# Patient Record
Sex: FEMALE | Race: OTHER | HISPANIC OR LATINO
[De-identification: names, ages, dates, MRNs, and addresses within clinical notes are randomized per-mention and may not be internally consistent; named-entity substitution may affect disease eponyms.]

---

## 2018-01-26 ENCOUNTER — RESULT REVIEW (OUTPATIENT)
Age: 48
End: 2018-01-26

## 2018-03-08 VITALS — WEIGHT: 258.2 LBS | BODY MASS INDEX: 48.75 KG/M2 | HEIGHT: 61 IN

## 2018-05-16 ENCOUNTER — RECORD ABSTRACTING (OUTPATIENT)
Age: 48
End: 2018-05-16

## 2018-05-16 DIAGNOSIS — Z83.49 FAMILY HISTORY OF OTHER ENDOCRINE, NUTRITIONAL AND METABOLIC DISEASES: ICD-10-CM

## 2018-05-16 DIAGNOSIS — Z87.59 PERSONAL HISTORY OF OTHER COMPLICATIONS OF PREGNANCY, CHILDBIRTH AND THE PUERPERIUM: ICD-10-CM

## 2018-05-16 DIAGNOSIS — Z92.89 PERSONAL HISTORY OF OTHER MEDICAL TREATMENT: ICD-10-CM

## 2018-05-16 DIAGNOSIS — Z82.49 FAMILY HISTORY OF ISCHEMIC HEART DISEASE AND OTHER DISEASES OF THE CIRCULATORY SYSTEM: ICD-10-CM

## 2018-05-16 DIAGNOSIS — Z78.9 OTHER SPECIFIED HEALTH STATUS: ICD-10-CM

## 2018-05-16 DIAGNOSIS — Z83.3 FAMILY HISTORY OF DIABETES MELLITUS: ICD-10-CM

## 2018-05-25 ENCOUNTER — APPOINTMENT (OUTPATIENT)
Dept: BARIATRICS | Facility: CLINIC | Age: 48
End: 2018-05-25
Payer: COMMERCIAL

## 2018-05-25 VITALS
BODY MASS INDEX: 49.65 KG/M2 | SYSTOLIC BLOOD PRESSURE: 162 MMHG | HEART RATE: 123 BPM | DIASTOLIC BLOOD PRESSURE: 98 MMHG | HEIGHT: 61 IN | WEIGHT: 263 LBS

## 2018-05-25 PROCEDURE — 99213 OFFICE O/P EST LOW 20 MIN: CPT

## 2018-06-15 ENCOUNTER — APPOINTMENT (OUTPATIENT)
Dept: BARIATRICS | Facility: CLINIC | Age: 48
End: 2018-06-15
Payer: COMMERCIAL

## 2018-06-15 VITALS
HEIGHT: 61 IN | WEIGHT: 239.6 LBS | SYSTOLIC BLOOD PRESSURE: 129 MMHG | BODY MASS INDEX: 45.24 KG/M2 | DIASTOLIC BLOOD PRESSURE: 83 MMHG

## 2018-06-15 PROCEDURE — 99024 POSTOP FOLLOW-UP VISIT: CPT

## 2018-07-05 ENCOUNTER — APPOINTMENT (OUTPATIENT)
Dept: BARIATRICS | Facility: CLINIC | Age: 48
End: 2018-07-05
Payer: COMMERCIAL

## 2018-07-05 VITALS
HEART RATE: 96 BPM | WEIGHT: 230 LBS | HEIGHT: 61 IN | BODY MASS INDEX: 43.43 KG/M2 | SYSTOLIC BLOOD PRESSURE: 135 MMHG | DIASTOLIC BLOOD PRESSURE: 84 MMHG

## 2018-07-05 PROCEDURE — 99024 POSTOP FOLLOW-UP VISIT: CPT

## 2018-08-02 ENCOUNTER — APPOINTMENT (OUTPATIENT)
Dept: BARIATRICS | Facility: CLINIC | Age: 48
End: 2018-08-02
Payer: COMMERCIAL

## 2018-08-02 VITALS
DIASTOLIC BLOOD PRESSURE: 89 MMHG | HEIGHT: 61 IN | SYSTOLIC BLOOD PRESSURE: 145 MMHG | HEART RATE: 73 BPM | WEIGHT: 222 LBS | BODY MASS INDEX: 41.91 KG/M2

## 2018-08-02 PROCEDURE — 99024 POSTOP FOLLOW-UP VISIT: CPT

## 2018-12-06 ENCOUNTER — APPOINTMENT (OUTPATIENT)
Dept: BARIATRICS | Facility: CLINIC | Age: 48
End: 2018-12-06
Payer: COMMERCIAL

## 2018-12-06 VITALS
HEIGHT: 65 IN | HEART RATE: 64 BPM | DIASTOLIC BLOOD PRESSURE: 82 MMHG | WEIGHT: 194.4 LBS | BODY MASS INDEX: 32.39 KG/M2 | SYSTOLIC BLOOD PRESSURE: 135 MMHG

## 2018-12-06 PROCEDURE — 99213 OFFICE O/P EST LOW 20 MIN: CPT

## 2018-12-10 ENCOUNTER — CLINICAL ADVICE (OUTPATIENT)
Age: 48
End: 2018-12-10

## 2018-12-22 ENCOUNTER — TRANSCRIPTION ENCOUNTER (OUTPATIENT)
Age: 48
End: 2018-12-22

## 2019-01-24 ENCOUNTER — RECORD ABSTRACTING (OUTPATIENT)
Age: 49
End: 2019-01-24

## 2019-01-24 DIAGNOSIS — G47.19 OTHER HYPERSOMNIA: ICD-10-CM

## 2019-01-24 DIAGNOSIS — D21.9 BENIGN NEOPLASM OF CONNECTIVE AND OTHER SOFT TISSUE, UNSPECIFIED: ICD-10-CM

## 2019-01-24 DIAGNOSIS — J30.1 ALLERGIC RHINITIS DUE TO POLLEN: ICD-10-CM

## 2019-01-24 LAB — CYTOLOGY CVX/VAG DOC THIN PREP: NORMAL

## 2019-01-29 ENCOUNTER — APPOINTMENT (OUTPATIENT)
Dept: OBGYN | Facility: CLINIC | Age: 49
End: 2019-01-29
Payer: COMMERCIAL

## 2019-01-29 VITALS
DIASTOLIC BLOOD PRESSURE: 80 MMHG | SYSTOLIC BLOOD PRESSURE: 128 MMHG | WEIGHT: 184 LBS | HEIGHT: 61 IN | BODY MASS INDEX: 34.74 KG/M2

## 2019-01-29 PROCEDURE — 99396 PREV VISIT EST AGE 40-64: CPT

## 2019-01-29 RX ORDER — TIOTROPIUM BROMIDE INHALATION SPRAY 1.56 UG/1
1.25 SPRAY, METERED RESPIRATORY (INHALATION)
Refills: 0 | Status: DISCONTINUED | COMMUNITY
End: 2019-01-29

## 2019-01-29 RX ORDER — LOSARTAN POTASSIUM AND HYDROCHLOROTHIAZIDE 100; 25 MG/1; MG/1
100-25 TABLET, FILM COATED ORAL DAILY
Refills: 0 | Status: DISCONTINUED | COMMUNITY
End: 2019-01-29

## 2019-01-29 RX ORDER — OMEPRAZOLE 40 MG/1
40 CAPSULE, DELAYED RELEASE ORAL
Refills: 0 | Status: DISCONTINUED | COMMUNITY
End: 2019-01-29

## 2019-01-29 RX ORDER — CETIRIZINE HYDROCHLORIDE 10 MG/1
10 CAPSULE, LIQUID FILLED ORAL
Refills: 0 | Status: DISCONTINUED | COMMUNITY
End: 2019-01-29

## 2019-01-29 RX ORDER — TIOTROPIUM BROMIDE INHALATION SPRAY 1.56 UG/1
1.25 SPRAY, METERED RESPIRATORY (INHALATION) DAILY
Refills: 0 | Status: DISCONTINUED | COMMUNITY
End: 2019-01-29

## 2019-01-29 RX ORDER — LOSARTAN POTASSIUM AND HYDROCHLOROTHIAZIDE 25; 100 MG/1; MG/1
100-25 TABLET ORAL
Refills: 0 | Status: DISCONTINUED | COMMUNITY
End: 2019-01-29

## 2019-01-29 RX ORDER — FLUTICASONE PROPIONATE AND SALMETEROL 50; 250 UG/1; UG/1
250-50 POWDER RESPIRATORY (INHALATION)
Refills: 0 | Status: DISCONTINUED | COMMUNITY
End: 2019-01-29

## 2019-01-29 NOTE — PHYSICAL EXAM
[Awake] : awake [Alert] : alert [Acute Distress] : no acute distress [Mass] : no breast mass [Nipple Discharge] : no nipple discharge [Axillary LAD] : no axillary lymphadenopathy [Soft] : soft [Tender] : non tender [Oriented x3] : oriented to person, place, and time [Normal] : uterus [No Bleeding] : there was no active vaginal bleeding [Uterine Adnexae] : were not tender and not enlarged [FreeTextEntry4] : No blood in the vagina

## 2019-01-30 LAB — HPV HIGH+LOW RISK DNA PNL CVX: NOT DETECTED

## 2019-02-04 LAB — CYTOLOGY CVX/VAG DOC THIN PREP: NORMAL

## 2019-03-04 ENCOUNTER — TRANSCRIPTION ENCOUNTER (OUTPATIENT)
Age: 49
End: 2019-03-04

## 2019-03-21 ENCOUNTER — CLINICAL ADVICE (OUTPATIENT)
Age: 49
End: 2019-03-21

## 2019-04-02 ENCOUNTER — APPOINTMENT (OUTPATIENT)
Dept: BARIATRICS | Facility: CLINIC | Age: 49
End: 2019-04-02
Payer: COMMERCIAL

## 2019-04-02 VITALS
SYSTOLIC BLOOD PRESSURE: 149 MMHG | BODY MASS INDEX: 33.99 KG/M2 | HEART RATE: 69 BPM | WEIGHT: 180 LBS | DIASTOLIC BLOOD PRESSURE: 83 MMHG | HEIGHT: 61 IN

## 2019-04-02 PROCEDURE — 99213 OFFICE O/P EST LOW 20 MIN: CPT

## 2019-04-22 ENCOUNTER — APPOINTMENT (OUTPATIENT)
Dept: INTERNAL MEDICINE | Facility: CLINIC | Age: 49
End: 2019-04-22
Payer: COMMERCIAL

## 2019-04-22 ENCOUNTER — NON-APPOINTMENT (OUTPATIENT)
Age: 49
End: 2019-04-22

## 2019-04-22 VITALS
SYSTOLIC BLOOD PRESSURE: 128 MMHG | DIASTOLIC BLOOD PRESSURE: 82 MMHG | BODY MASS INDEX: 33.23 KG/M2 | HEIGHT: 61 IN | WEIGHT: 176 LBS

## 2019-04-22 DIAGNOSIS — Z77.21 CONTACT WITH AND (SUSPECTED) EXPOSURE TO POTENTIALLY HAZARDOUS BODY FLUIDS: ICD-10-CM

## 2019-04-22 DIAGNOSIS — Z87.891 PERSONAL HISTORY OF NICOTINE DEPENDENCE: ICD-10-CM

## 2019-04-22 DIAGNOSIS — Z82.49 FAMILY HISTORY OF ISCHEMIC HEART DISEASE AND OTHER DISEASES OF THE CIRCULATORY SYSTEM: ICD-10-CM

## 2019-04-22 PROCEDURE — 93000 ELECTROCARDIOGRAM COMPLETE: CPT

## 2019-04-22 PROCEDURE — 36415 COLL VENOUS BLD VENIPUNCTURE: CPT

## 2019-04-22 PROCEDURE — 99396 PREV VISIT EST AGE 40-64: CPT | Mod: 25

## 2019-04-22 NOTE — PHYSICAL EXAM
[Normal Female:] : bladder was normal on palpation [Normal] : normal gait, coordination grossly intact, no focal deficits [de-identified] : Deferred GYN, denies lumps pain discharge [FreeTextEntry1] : Deferred GYN/GI [de-identified] : No lymphadenopathy [de-identified] : Denies excessive thirst, urination, fatigue [de-identified] : No rash, skin lesions [de-identified] : Alert and oriented x3, appropriate mood and affect

## 2019-04-22 NOTE — HISTORY OF PRESENT ILLNESS
[FreeTextEntry1] : Annual exam [de-identified] : 49-year-old female presents for annual exam, had bariatric surgery less than a year ago, has lost 85 pounds, going to the gym 4-5 days a week. Feels great, looks good\par \par Turned 49 yesterday, will schedule appointment with Dr. Berry for colonoscopy.\par \par Mammo and ultrasound due in August, up to date with all other screenings

## 2019-04-22 NOTE — PLAN
[FreeTextEntry1] : 49-year-old female, has lost about 85 pounds in the past 11 months following gastric sleeve surgery. Feels great, gym 4-5 days a week\par \par We'll make appointments for mammogram, ultrasound and colonoscopy, call next week to review results

## 2019-04-22 NOTE — HEALTH RISK ASSESSMENT
[Very Good] : ~his/her~ current health as very good [Patient reported mammogram was normal] : Patient reported mammogram was normal [Patient reported PAP Smear was normal] : Patient reported PAP Smear was normal [Patient declined bone density test] : Patient declined bone density test [HIV Test offered] : HIV Test offered [Hepatitis C test offered] : Hepatitis C test offered [Employed] : employed [] :  [] : No [MammogramDate] : 04/2019 [PapSmearDate] : 04/2019

## 2019-04-23 LAB
25(OH)D3 SERPL-MCNC: 34 NG/ML
ALBUMIN SERPL ELPH-MCNC: 4.6 G/DL
ALP BLD-CCNC: 53 U/L
ALT SERPL-CCNC: 13 U/L
ANION GAP SERPL CALC-SCNC: 12 MMOL/L
APPEARANCE: CLEAR
AST SERPL-CCNC: 14 U/L
BASOPHILS # BLD AUTO: 0.05 K/UL
BASOPHILS NFR BLD AUTO: 0.8 %
BILIRUB SERPL-MCNC: 0.6 MG/DL
BILIRUBIN URINE: NEGATIVE
BLOOD URINE: NEGATIVE
BUN SERPL-MCNC: 14 MG/DL
CALCIUM SERPL-MCNC: 9.6 MG/DL
CHLORIDE SERPL-SCNC: 106 MMOL/L
CHOLEST SERPL-MCNC: 155 MG/DL
CHOLEST/HDLC SERPL: 2.6 RATIO
CO2 SERPL-SCNC: 26 MMOL/L
COLOR: YELLOW
CREAT SERPL-MCNC: 0.87 MG/DL
EOSINOPHIL # BLD AUTO: 0.29 K/UL
EOSINOPHIL NFR BLD AUTO: 4.8 %
FOLATE SERPL-MCNC: 9.1 NG/ML
GLUCOSE QUALITATIVE U: NEGATIVE
GLUCOSE SERPL-MCNC: 76 MG/DL
HCT VFR BLD CALC: 43.6 %
HCV AB SER QL: NONREACTIVE
HCV S/CO RATIO: 0.05 S/CO
HDLC SERPL-MCNC: 60 MG/DL
HGB BLD-MCNC: 14 G/DL
HIV1+2 AB SPEC QL IA.RAPID: NONREACTIVE
IMM GRANULOCYTES NFR BLD AUTO: 0.2 %
KETONES URINE: NEGATIVE
LDLC SERPL CALC-MCNC: 78 MG/DL
LEUKOCYTE ESTERASE URINE: NEGATIVE
LYMPHOCYTES # BLD AUTO: 2.43 K/UL
LYMPHOCYTES NFR BLD AUTO: 39.8 %
MAN DIFF?: NORMAL
MCHC RBC-ENTMCNC: 30.4 PG
MCHC RBC-ENTMCNC: 32.1 GM/DL
MCV RBC AUTO: 94.6 FL
MONOCYTES # BLD AUTO: 0.33 K/UL
MONOCYTES NFR BLD AUTO: 5.4 %
NEUTROPHILS # BLD AUTO: 2.99 K/UL
NEUTROPHILS NFR BLD AUTO: 49 %
NITRITE URINE: NEGATIVE
PH URINE: 6
PLATELET # BLD AUTO: 295 K/UL
POTASSIUM SERPL-SCNC: 4.1 MMOL/L
PROT SERPL-MCNC: 7.4 G/DL
PROTEIN URINE: NORMAL
RBC # BLD: 4.61 M/UL
RBC # FLD: 12.8 %
SODIUM SERPL-SCNC: 144 MMOL/L
SPECIFIC GRAVITY URINE: 1.03
T4 FREE SERPL-MCNC: 1.4 NG/DL
TRIGL SERPL-MCNC: 84 MG/DL
TSH SERPL-ACNC: 1.66 UIU/ML
UROBILINOGEN URINE: NORMAL
VIT B12 SERPL-MCNC: 395 PG/ML
WBC # FLD AUTO: 6.1 K/UL

## 2019-08-21 ENCOUNTER — OTHER (OUTPATIENT)
Age: 49
End: 2019-08-21

## 2019-08-21 ENCOUNTER — RESULT REVIEW (OUTPATIENT)
Age: 49
End: 2019-08-21

## 2019-08-21 ENCOUNTER — APPOINTMENT (OUTPATIENT)
Dept: GASTROENTEROLOGY | Facility: CLINIC | Age: 49
End: 2019-08-21
Payer: COMMERCIAL

## 2019-08-21 VITALS
DIASTOLIC BLOOD PRESSURE: 64 MMHG | OXYGEN SATURATION: 98 % | WEIGHT: 175 LBS | BODY MASS INDEX: 33.04 KG/M2 | HEIGHT: 61 IN | HEART RATE: 64 BPM | SYSTOLIC BLOOD PRESSURE: 122 MMHG

## 2019-08-21 PROCEDURE — 99204 OFFICE O/P NEW MOD 45 MIN: CPT

## 2019-08-21 RX ORDER — MULTIVITAMIN
CAPSULE ORAL
Refills: 0 | Status: DISCONTINUED | COMMUNITY
End: 2019-08-21

## 2019-08-21 RX ORDER — ACETAMINOPHEN 325 MG/1
325 TABLET, FILM COATED ORAL
Refills: 0 | Status: DISCONTINUED | COMMUNITY
End: 2019-08-21

## 2019-08-21 NOTE — PHYSICAL EXAM
[General Appearance - Alert] : alert [General Appearance - In No Acute Distress] : in no acute distress [Sclera] : the sclera and conjunctiva were normal [Outer Ear] : the ears and nose were normal in appearance [Hearing Threshold Finger Rub Not [place]] : hearing was normal [Neck Appearance] : the appearance of the neck was normal [] : no respiratory distress [Apical Impulse] : the apical impulse was normal [Abdomen Soft] : soft [Abdomen Tenderness] : non-tender [Abnormal Walk] : normal gait [Skin Color & Pigmentation] : normal skin color and pigmentation [No Focal Deficits] : no focal deficits [Oriented To Time, Place, And Person] : oriented to person, place, and time [FreeTextEntry1] : deferred to upcoming colonoscopy

## 2019-08-21 NOTE — HISTORY OF PRESENT ILLNESS
[FreeTextEntry1] : 49 year F s/p gastric sleeve 2018, s/p hysteroscopy, s/p knee surgery seen at the request of ROSEANNE Saavedra for the evaluation of colon cancer screening\par \par No prior colonoscopy\par \par Patient denies abdominal pain , n/v/heartburn, reflux, dysphagia/odynophagia, change in bowel habits, diarrhea, brbpr, melena. no weight loss.  Good appetite and energy level. Patient has daily BM, denies regular NSAID use. \par \par She has more constipation around time of menses\par \par Famhx negative for  colon polyps or colon cancer\par \par she has had prior EGD prior to gastric sleeve and it showed fundic gland polyps\par \par

## 2019-08-21 NOTE — ASSESSMENT
[FreeTextEntry1] : Risks (including but not limited to sedation risks, infection, bleeding, perforation, possibility of missed lesions), benefits and alternatives to procedure, including not doing the procedure, were discussed with patient. Patient understood and agreed to proceed with colonoscopy. \par Colonoscopy preparation instructions reviewed with patient.\par Split MiraLAX preparation, low fiber diet x 5 days prior to procedure\par

## 2019-09-25 ENCOUNTER — RESULT REVIEW (OUTPATIENT)
Age: 49
End: 2019-09-25

## 2019-09-26 ENCOUNTER — RESULT REVIEW (OUTPATIENT)
Age: 49
End: 2019-09-26

## 2019-09-26 ENCOUNTER — APPOINTMENT (OUTPATIENT)
Dept: GASTROENTEROLOGY | Facility: HOSPITAL | Age: 49
End: 2019-09-26

## 2019-10-02 ENCOUNTER — APPOINTMENT (OUTPATIENT)
Dept: BARIATRICS | Facility: CLINIC | Age: 49
End: 2019-10-02

## 2020-04-26 ENCOUNTER — MESSAGE (OUTPATIENT)
Age: 50
End: 2020-04-26

## 2020-04-27 ENCOUNTER — APPOINTMENT (OUTPATIENT)
Dept: INTERNAL MEDICINE | Facility: CLINIC | Age: 50
End: 2020-04-27
Payer: COMMERCIAL

## 2020-04-27 ENCOUNTER — NON-APPOINTMENT (OUTPATIENT)
Age: 50
End: 2020-04-27

## 2020-04-27 VITALS
DIASTOLIC BLOOD PRESSURE: 80 MMHG | HEIGHT: 61 IN | SYSTOLIC BLOOD PRESSURE: 110 MMHG | WEIGHT: 188 LBS | BODY MASS INDEX: 35.5 KG/M2

## 2020-04-27 PROCEDURE — 93000 ELECTROCARDIOGRAM COMPLETE: CPT

## 2020-04-27 PROCEDURE — 36415 COLL VENOUS BLD VENIPUNCTURE: CPT

## 2020-04-27 PROCEDURE — 99396 PREV VISIT EST AGE 40-64: CPT | Mod: 25

## 2020-04-27 NOTE — PHYSICAL EXAM
[Normal Female:] : bladder was normal on palpation [Normal] : no joint swelling, grossly normal strength/tone [de-identified] : Deferred GYN; Denies pain, lumps and discharge [FreeTextEntry1] : .r/GYN [de-identified] : No lymphadenopathy [de-identified] : Denies excessive thirst, urination, fatigue [de-identified] : No rash or skin lesion [de-identified] : Alert and Oriented x3.  Appropriate mood and affect

## 2020-04-27 NOTE — PLAN
[FreeTextEntry1] : 50-year-old female  at [facility] [place] [facility], 75 pound weight loss, after gastric sleeve, over the past 2 years. Reviewed diet, encouraged to increase exercise.\par \par Call 4-5 days to review labs

## 2020-04-27 NOTE — HEALTH RISK ASSESSMENT
[Very Good] : ~his/her~ current health as very good [Yes] : Yes [Monthly or less (1 pt)] : Monthly or less (1 point) [Never (0 pts)] : Never (0 points) [1 or 2 (0 pts)] : 1 or 2 (0 points) [0] : 1) Little interest or pleasure doing things: Not at all (0) [No falls in past year] : Patient reported no falls in the past year [Patient reported PAP Smear was normal] : Patient reported PAP Smear was normal [Patient reported mammogram was normal] : Patient reported mammogram was normal [Patient declined bone density test] : Patient declined bone density test [HIV test declined] : HIV test declined [Patient reported colonoscopy was normal] : Patient reported colonoscopy was normal [Hepatitis C test declined] : Hepatitis C test declined [] : No [[id number]] : 0 [MammogramDate] : 08/19 [PapSmearDate] : 01/19 [ColonoscopyDate] : 09/19

## 2020-04-27 NOTE — HISTORY OF PRESENT ILLNESS
[FreeTextEntry1] : Annual exam [de-identified] : 50-year-old female status post gastric sleeve approximately 2 years ago has lost about 75 pounds. It's been difficult during this past couple of months due to the pandemic, she is furloughed every other week and the gym is closed but she's trying and she is getting out most days to walk. Denies chest pain shortness of breath palpitations dizziness\par \par Up-to-date with screenings

## 2020-04-28 LAB
ALBUMIN SERPL ELPH-MCNC: 4.3 G/DL
ALP BLD-CCNC: 53 U/L
ALT SERPL-CCNC: 14 U/L
ANION GAP SERPL CALC-SCNC: 14 MMOL/L
APPEARANCE: CLEAR
AST SERPL-CCNC: 15 U/L
BASOPHILS # BLD AUTO: 0.06 K/UL
BASOPHILS NFR BLD AUTO: 1 %
BILIRUB SERPL-MCNC: 0.3 MG/DL
BILIRUBIN URINE: NEGATIVE
BLOOD URINE: NEGATIVE
BUN SERPL-MCNC: 11 MG/DL
CALCIUM SERPL-MCNC: 9.2 MG/DL
CHLORIDE SERPL-SCNC: 104 MMOL/L
CHOLEST SERPL-MCNC: 180 MG/DL
CHOLEST/HDLC SERPL: 2.7 RATIO
CO2 SERPL-SCNC: 24 MMOL/L
COLOR: YELLOW
CREAT SERPL-MCNC: 0.78 MG/DL
EOSINOPHIL # BLD AUTO: 0.26 K/UL
EOSINOPHIL NFR BLD AUTO: 4.2 %
ESTIMATED AVERAGE GLUCOSE: 94 MG/DL
FOLATE SERPL-MCNC: 9.2 NG/ML
GLUCOSE QUALITATIVE U: NEGATIVE
GLUCOSE SERPL-MCNC: 96 MG/DL
HBA1C MFR BLD HPLC: 4.9 %
HCT VFR BLD CALC: 41.1 %
HDLC SERPL-MCNC: 68 MG/DL
HGB BLD-MCNC: 13.4 G/DL
IMM GRANULOCYTES NFR BLD AUTO: 0.2 %
KETONES URINE: NEGATIVE
LDLC SERPL CALC-MCNC: 82 MG/DL
LEUKOCYTE ESTERASE URINE: NEGATIVE
LYMPHOCYTES # BLD AUTO: 2.12 K/UL
LYMPHOCYTES NFR BLD AUTO: 34.3 %
MAN DIFF?: NORMAL
MCHC RBC-ENTMCNC: 30.5 PG
MCHC RBC-ENTMCNC: 32.6 GM/DL
MCV RBC AUTO: 93.4 FL
MONOCYTES # BLD AUTO: 0.35 K/UL
MONOCYTES NFR BLD AUTO: 5.7 %
NEUTROPHILS # BLD AUTO: 3.38 K/UL
NEUTROPHILS NFR BLD AUTO: 54.6 %
NITRITE URINE: NEGATIVE
PH URINE: 5.5
PLATELET # BLD AUTO: 220 K/UL
POTASSIUM SERPL-SCNC: 4 MMOL/L
PROT SERPL-MCNC: 6.7 G/DL
PROTEIN URINE: NEGATIVE
RBC # BLD: 4.4 M/UL
RBC # FLD: 12.1 %
SODIUM SERPL-SCNC: 143 MMOL/L
SPECIFIC GRAVITY URINE: 1.02
T4 FREE SERPL-MCNC: 1.1 NG/DL
TRIGL SERPL-MCNC: 150 MG/DL
TSH SERPL-ACNC: 2.46 UIU/ML
UROBILINOGEN URINE: NORMAL
VIT B12 SERPL-MCNC: 348 PG/ML
WBC # FLD AUTO: 6.18 K/UL

## 2020-05-06 ENCOUNTER — APPOINTMENT (OUTPATIENT)
Dept: DISASTER EMERGENCY | Facility: HOSPITAL | Age: 50
End: 2020-05-06

## 2020-05-07 LAB
SARS-COV-2 IGG SERPL IA-ACNC: <0.1 INDEX
SARS-COV-2 IGG SERPL QL IA: NEGATIVE

## 2020-06-17 ENCOUNTER — TRANSCRIPTION ENCOUNTER (OUTPATIENT)
Age: 50
End: 2020-06-17

## 2020-08-25 ENCOUNTER — RESULT REVIEW (OUTPATIENT)
Age: 50
End: 2020-08-25

## 2020-09-16 ENCOUNTER — RESULT REVIEW (OUTPATIENT)
Age: 50
End: 2020-09-16

## 2020-11-18 ENCOUNTER — APPOINTMENT (OUTPATIENT)
Dept: OBGYN | Facility: CLINIC | Age: 50
End: 2020-11-18
Payer: COMMERCIAL

## 2020-11-18 VITALS
DIASTOLIC BLOOD PRESSURE: 78 MMHG | HEIGHT: 61 IN | BODY MASS INDEX: 36.06 KG/M2 | WEIGHT: 191 LBS | SYSTOLIC BLOOD PRESSURE: 112 MMHG

## 2020-11-18 PROCEDURE — 99396 PREV VISIT EST AGE 40-64: CPT

## 2020-11-18 NOTE — HISTORY OF PRESENT ILLNESS
[TextBox_4] : 51yo P2 here for annual.  No periods since 2019.  H/o ablation for abnormal bleeding in 2/2016.  She had a sleeve gastrectomy 2018 with Jazz and has lost 80 lbs.  She is now more active and working out with her kids.\par        ; 2 children- 24 and 21yo. Both are home. 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  \par

## 2020-11-30 LAB
CYTOLOGY CVX/VAG DOC THIN PREP: ABNORMAL
HPV HIGH+LOW RISK DNA PNL CVX: NOT DETECTED

## 2021-01-20 ENCOUNTER — APPOINTMENT (OUTPATIENT)
Dept: OBGYN | Facility: CLINIC | Age: 51
End: 2021-01-20

## 2021-03-03 DIAGNOSIS — N64.89 OTHER SPECIFIED DISORDERS OF BREAST: ICD-10-CM

## 2021-03-17 ENCOUNTER — RESULT REVIEW (OUTPATIENT)
Age: 51
End: 2021-03-17

## 2021-04-19 ENCOUNTER — APPOINTMENT (OUTPATIENT)
Dept: INTERNAL MEDICINE | Facility: CLINIC | Age: 51
End: 2021-04-19

## 2021-05-28 ENCOUNTER — APPOINTMENT (OUTPATIENT)
Dept: INTERNAL MEDICINE | Facility: CLINIC | Age: 51
End: 2021-05-28
Payer: COMMERCIAL

## 2021-05-28 ENCOUNTER — NON-APPOINTMENT (OUTPATIENT)
Age: 51
End: 2021-05-28

## 2021-05-28 VITALS — TEMPERATURE: 98 F | HEIGHT: 61 IN | BODY MASS INDEX: 37.76 KG/M2 | WEIGHT: 200 LBS

## 2021-05-28 DIAGNOSIS — R92.2 INCONCLUSIVE MAMMOGRAM: ICD-10-CM

## 2021-05-28 DIAGNOSIS — Z12.31 ENCOUNTER FOR SCREENING MAMMOGRAM FOR MALIGNANT NEOPLASM OF BREAST: ICD-10-CM

## 2021-05-28 PROCEDURE — 99072 ADDL SUPL MATRL&STAF TM PHE: CPT

## 2021-05-28 PROCEDURE — 99386 PREV VISIT NEW AGE 40-64: CPT | Mod: 25

## 2021-05-28 PROCEDURE — 36415 COLL VENOUS BLD VENIPUNCTURE: CPT

## 2021-05-28 RX ORDER — CETIRIZINE HYDROCHLORIDE 10 MG/1
10 TABLET, FILM COATED ORAL
Refills: 0 | Status: ACTIVE | COMMUNITY
Start: 2021-05-28

## 2021-05-28 NOTE — HEALTH RISK ASSESSMENT
[Very Good] : ~his/her~  mood as very good [HIV Test offered] : HIV Test offered [Hepatitis C test offered] : Hepatitis C test offered [Employed] : employed [] :  [Yes] : Yes [Monthly or less (1 pt)] : Monthly or less (1 point) [1 or 2 (0 pts)] : 1 or 2 (0 points) [Never (0 pts)] : Never (0 points) [No] : In the past 12 months have you used drugs other than those required for medical reasons? No [No falls in past year] : Patient reported no falls in the past year [1] : 2) Feeling down, depressed, or hopeless for several days (1) [0] : 1) Little interest or pleasure doing things: Not at all (0) [Patient reported mammogram was normal] : Patient reported mammogram was normal [Patient reported PAP Smear was normal] : Patient reported PAP Smear was normal [With Family] : lives with family [# of Members in Household ___] :  household currently consist of [unfilled] member(s) [High School] : high school [Feels Safe at Home] : Feels safe at home [Reports normal functional visual acuity (ie: able to read med bottle)] : Reports normal functional visual acuity [Smoke Detector] : smoke detector [Carbon Monoxide Detector] : carbon monoxide detector [Seat Belt] :  uses seat belt [Sunscreen] : uses sunscreen [With Patient/Caregiver] : With Patient/Caregiver [Patient reported colonoscopy was abnormal] : Patient reported colonoscopy was abnormal [I will adhere to the patient's wishes as expressed in the advance directive except as noted below.] : I will adhere to the patient's wishes as expressed in the advance directive except as noted below [] : No [de-identified] : Never a smoker  [de-identified] : once a year [Audit-CScore] : 1 [de-identified] : gym 3-4 x week  [[id number]] : 1 [de-identified] : well balanced  [Reports changes in hearing] : Reports no changes in hearing [Reports changes in vision] : Reports no changes in vision [Reports changes in dental health] : Reports no changes in dental health [Guns at Home] : no guns at home [Safety elements used in home] : no safety elements used in home [Travel to Developing Areas] : does not  travel to developing areas [Caregiver Concerns] : does not have caregiver concerns [MammogramDate] : 3/17/2021 [PapSmearDate] : 11/18/2020 [ColonoscopyDate] : 9/26/2019 [ColonoscopyComments] : due 2024 [FreeTextEntry2] :  @ [facility]  [de-identified] : last exam 3/2021 [de-identified] : implants -last exam 11/2020 [AdvancecareDate] : 5/2021 [FreeTextEntry4] : Patient will fill form out at home and bring in copy

## 2021-05-28 NOTE — HISTORY OF PRESENT ILLNESS
[FreeTextEntry1] : physical [de-identified] : Patient is a 51F with obesity, knee athritis (s/p bilateral partial knee replacement), genital  HSV, s/p sleeve gastrectomy presents today for annual exam \par s/p partial knee replacement (right 4/2013, left 8/2016 - [facility] Dr. Gage Chambers)\par goes to the gym, experiences knee pain with leg work since surgery. \par left elbow pain (inner) \par

## 2021-05-29 ENCOUNTER — TRANSCRIPTION ENCOUNTER (OUTPATIENT)
Age: 51
End: 2021-05-29

## 2021-05-29 LAB
25(OH)D3 SERPL-MCNC: 46.3 NG/ML
ALBUMIN SERPL ELPH-MCNC: 4.1 G/DL
ALP BLD-CCNC: 58 U/L
ALT SERPL-CCNC: 14 U/L
ANION GAP SERPL CALC-SCNC: 12 MMOL/L
AST SERPL-CCNC: 15 U/L
BASOPHILS # BLD AUTO: 0.06 K/UL
BASOPHILS NFR BLD AUTO: 0.9 %
BILIRUB SERPL-MCNC: 0.2 MG/DL
BUN SERPL-MCNC: 8 MG/DL
CALCIUM SERPL-MCNC: 9.6 MG/DL
CHLORIDE SERPL-SCNC: 106 MMOL/L
CHOLEST SERPL-MCNC: 197 MG/DL
CO2 SERPL-SCNC: 26 MMOL/L
CREAT SERPL-MCNC: 0.87 MG/DL
EOSINOPHIL # BLD AUTO: 0.14 K/UL
EOSINOPHIL NFR BLD AUTO: 2 %
ESTIMATED AVERAGE GLUCOSE: 105 MG/DL
GLUCOSE SERPL-MCNC: 95 MG/DL
HBA1C MFR BLD HPLC: 5.3 %
HCT VFR BLD CALC: 39.9 %
HDLC SERPL-MCNC: 68 MG/DL
HGB BLD-MCNC: 12.9 G/DL
IMM GRANULOCYTES NFR BLD AUTO: 0.4 %
LDLC SERPL CALC-MCNC: 104 MG/DL
LYMPHOCYTES # BLD AUTO: 2.34 K/UL
LYMPHOCYTES NFR BLD AUTO: 33.8 %
MAN DIFF?: NORMAL
MCHC RBC-ENTMCNC: 29.7 PG
MCHC RBC-ENTMCNC: 32.3 GM/DL
MCV RBC AUTO: 91.9 FL
MONOCYTES # BLD AUTO: 0.32 K/UL
MONOCYTES NFR BLD AUTO: 4.6 %
NEUTROPHILS # BLD AUTO: 4.04 K/UL
NEUTROPHILS NFR BLD AUTO: 58.3 %
NONHDLC SERPL-MCNC: 129 MG/DL
PLATELET # BLD AUTO: 330 K/UL
POTASSIUM SERPL-SCNC: 3.9 MMOL/L
PROT SERPL-MCNC: 7.2 G/DL
RBC # BLD: 4.34 M/UL
RBC # FLD: 13.2 %
SODIUM SERPL-SCNC: 144 MMOL/L
T4 FREE SERPL-MCNC: 0.8 NG/DL
TRIGL SERPL-MCNC: 128 MG/DL
TSH SERPL-ACNC: 1.52 UIU/ML
WBC # FLD AUTO: 6.93 K/UL

## 2021-05-31 ENCOUNTER — TRANSCRIPTION ENCOUNTER (OUTPATIENT)
Age: 51
End: 2021-05-31

## 2021-05-31 LAB
THYROGLOB AB SERPL-ACNC: <20 IU/ML
THYROPEROXIDASE AB SERPL IA-ACNC: 40.6 IU/ML

## 2021-09-07 ENCOUNTER — APPOINTMENT (OUTPATIENT)
Dept: FAMILY MEDICINE | Facility: CLINIC | Age: 51
End: 2021-09-07
Payer: COMMERCIAL

## 2021-09-07 VITALS
BODY MASS INDEX: 37.95 KG/M2 | OXYGEN SATURATION: 98 % | DIASTOLIC BLOOD PRESSURE: 100 MMHG | HEIGHT: 61 IN | HEART RATE: 83 BPM | WEIGHT: 201 LBS | TEMPERATURE: 97.8 F | SYSTOLIC BLOOD PRESSURE: 144 MMHG

## 2021-09-07 DIAGNOSIS — Z12.11 ENCOUNTER FOR SCREENING FOR MALIGNANT NEOPLASM OF COLON: ICD-10-CM

## 2021-09-07 DIAGNOSIS — M54.32 SCIATICA, LEFT SIDE: ICD-10-CM

## 2021-09-07 PROCEDURE — 99213 OFFICE O/P EST LOW 20 MIN: CPT

## 2021-09-07 RX ORDER — IBUPROFEN 200 MG/1
200 TABLET, COATED ORAL
Refills: 0 | Status: DISCONTINUED | COMMUNITY
End: 2021-09-07

## 2021-09-08 PROBLEM — M54.32 SCIATICA OF LEFT SIDE: Status: ACTIVE | Noted: 2021-09-07

## 2021-09-08 PROBLEM — Z12.11 COLON CANCER SCREENING: Status: RESOLVED | Noted: 2019-08-21 | Resolved: 2021-09-08

## 2021-09-08 NOTE — PHYSICAL EXAM
[Normal] : normal rate, regular rhythm, normal S1 and S2 and no murmur heard [de-identified] : left lower back and buttock very tender to palpation. SLR +. limited exam due to pt discomfort.

## 2021-09-08 NOTE — HEALTH RISK ASSESSMENT
[No] : No [No falls in past year] : Patient reported no falls in the past year [0] : 2) Feeling down, depressed, or hopeless: Not at all (0) [] : No [[id number]] : 0

## 2021-09-08 NOTE — HISTORY OF PRESENT ILLNESS
[FreeTextEntry8] : Pt complains of left sided back pain/sciatica. It started after she was cleaning. She had similar pain in the past but it usually resolved shortly after with ice and rest. She tried taking Advil and it didn't help. She had someone massage it but it might have made it feel worse. She has difficulty sitting and standing due to the pain.

## 2021-09-30 ENCOUNTER — RX RENEWAL (OUTPATIENT)
Age: 51
End: 2021-09-30

## 2021-10-12 ENCOUNTER — NON-APPOINTMENT (OUTPATIENT)
Age: 51
End: 2021-10-12

## 2021-11-03 ENCOUNTER — APPOINTMENT (OUTPATIENT)
Dept: OBGYN | Facility: CLINIC | Age: 51
End: 2021-11-03
Payer: COMMERCIAL

## 2021-11-03 VITALS
WEIGHT: 198.13 LBS | BODY MASS INDEX: 37.41 KG/M2 | DIASTOLIC BLOOD PRESSURE: 80 MMHG | HEIGHT: 61 IN | SYSTOLIC BLOOD PRESSURE: 130 MMHG

## 2021-11-03 PROCEDURE — 99396 PREV VISIT EST AGE 40-64: CPT

## 2021-11-03 NOTE — PHYSICAL EXAM
[Chaperone Declined] : Patient declined chaperone [Appropriately responsive] : appropriately responsive [Alert] : alert [No Acute Distress] : no acute distress [No Lymphadenopathy] : no lymphadenopathy [Soft] : soft [Non-tender] : non-tender [Non-distended] : non-distended [No HSM] : No HSM [No Lesions] : no lesions [No Mass] : no mass [Oriented x3] : oriented x3 [Examination Of The Breasts] : a normal appearance [No Masses] : no breast masses were palpable [Vulvar Atrophy] : vulvar atrophy [Labia Majora] : normal [Labia Minora] : normal [Atrophy] : atrophy [Normal] : normal [Uterine Adnexae] : normal

## 2021-11-03 NOTE — HISTORY OF PRESENT ILLNESS
[TextBox_4] : 50yo P2 here for annual. No periods since 2019. H/o ablation for abnormal bleeding in 2/2016. She had a sleeve gastrectomy 2018 with Jazz and has lost 80 lbs. She is now more active and working out with her kids.\par  ; 2 children- 24 and 19yo. Both are home. Pt works at a pediatric office; 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 \par

## 2021-11-10 LAB
CYTOLOGY CVX/VAG DOC THIN PREP: ABNORMAL
HPV HIGH+LOW RISK DNA PNL CVX: NOT DETECTED

## 2022-01-21 ENCOUNTER — APPOINTMENT (OUTPATIENT)
Dept: PAIN MANAGEMENT | Facility: CLINIC | Age: 52
End: 2022-01-21
Payer: COMMERCIAL

## 2022-01-21 VITALS
HEIGHT: 61 IN | DIASTOLIC BLOOD PRESSURE: 88 MMHG | SYSTOLIC BLOOD PRESSURE: 139 MMHG | BODY MASS INDEX: 38.33 KG/M2 | TEMPERATURE: 98 F | WEIGHT: 203 LBS

## 2022-01-21 PROCEDURE — 99204 OFFICE O/P NEW MOD 45 MIN: CPT

## 2022-01-21 NOTE — HISTORY OF PRESENT ILLNESS
[___ mths] : [unfilled] month(s) ago [Paroxysmal] : paroxysmal [5] : a maximum pain level of 5/10 [Sharp] : sharp [Aching] : aching [Throbbing] : throbbing [Burning] : burning [Shooting] : shooting [Sitting] : sitting [Medications] : medications [Heat] : heat [FreeTextEntry1] : HPI\par \par MsJemal YOUNG is a 51 year F with pmhx of sp bilateral partial knee replacement presents with left back and anterolateral thigh pain for 6 months after twisting.  Pain is so bad that patient finds it difficult to perform adls and ambulate. denies any worsening numbness, weakness, bowel/bladder dysfunction. \par \par \par Previous and current pain medications/doses/effects:\par \par tylenol without improvement\par \par Previous Pain Treatments:\par \par PT without improvement\par \par Previous Pain Injections:\par \par Facet nerve block\par SIJ injection\par \par Previous Diagnostic Studies/Images:\par \par MRI LS 10/21\par \par  The paraspinal musculature including the psoas muscle, multifidus muscles, and \par  immediate para axial soft tissues appear within range of normal without \par  evidence of mass or collection. \par \par  There is a maintenance of the normal lumbar lordosis. There is fairly uniform \par  marrow signal on all sequences. \par \par  The spinal canal is patent without detectable intradural or extradural mass or \par  collection. \par \par  At L5-S1 \par  There is disc desiccation and bulging of the outer annular fibers \par  asymmetrically prominent to the left mildly effacing the left lateral recess \par  and with a shallow left foraminal bulge. \par \par  At L4-L5 \par  There is disc desiccation and circumferential disc bulging asymmetrically \par  pronounced to the left. There is a small left foraminal disc protrusion best \par  seen on axial image 14 series 7 and sagittal image 7 series 6 with a tiny \par  cranially extruded fragment. \par \par  Bony productive changes associated with facet arthropathy are present at this \par  level. \par \par  At L3-L4 \par  There is disc desiccation but no focal disc protrusion. \par \par  At L2-L3 \par  The disc appears intact. There is no significant central or foraminal \par  stenosis. \par \par  At L1-L2 \par  The disc appears intact. There is no significant central or foraminal \par  stenosis. \par \par  At T12-L1 there is disc desiccation loss of intervertebral disc space height. \par  A shallow right foraminal disc protrusion is present. \par \par  IMPRESSION: \par \par  Shallow right foraminal disc protrusion at T12-L1 \par \par  Small left foraminal disc protrusion with tiny cranially extruded disc \par  fragment L4-L5 \par \par  Circumferential bulging of the L5-S1 disc asymmetrically prominent to the left \par  includes a shallow left foraminal disc bulge \par \par \par  [FreeTextEntry2] : 15 [FreeTextEntry7] : Lower back pain , more on the left side  [FreeTextEntry4] : PT

## 2022-01-21 NOTE — PHYSICAL EXAM
[Normal muscle bulk without asymmetry] : normal muscle bulk without asymmetry [Facet Tenderness] : facet tenderness [Spine: Flexion to ___ degrees, without pain] : spine: flexion to [unfilled] degrees, without pain [SI Provacative Testing] : positive SI Provacative Testing [Normal] : Normal affect [ADIEL Test] : positive ADIEL Test (Errol's Test) [de-identified] : Constitutional: Normal, well developed, no acute distress\par Eyes: Symmetric, External structures \par Oropharynx: Lips normal, symmetric, no external lesions appreciated\par Respiratory: Non-labored breathing, no audible wheezes\par Cardiac: Pulse palpated, no tachycardia\par Vascular: No cyanosis appreciated, no edema in bilateral lower extremities\par GI: Nondistended, no jaundice appreciated\par Neurovascular: CN2-12 grossly intact, Alert and oriented\par MSK: Normal muscle bulk, 5/5 Motor strength B/L in LE\par \par

## 2022-01-21 NOTE — ASSESSMENT
[FreeTextEntry1] : >> Imaging and Other Studies\par \par I personally reviewed the relevant imaging.  Discussed and explained to patient the likely source of pathology and pain.  Questions answered. MRI \par \par >> Therapy and Other Modalities\par \par PT pending intervention\par \par >> Medications\par \par acetaminophen 650mg q8h prn pain (caution <3g daily)\par  \par >> Interventions\par \par Significant component of back and leg pain likely secondary to lumbar spinal stenosis demonstrated on MRI LS.  Will schedule L4-5 interlaminar epidural steroid injection r/b/a discussed\par \par >> Consults\par \par >> Discussion of Risks/Benefits/Alternatives\par \par 	>Regarding any scheduled procedures:\par \par I have discussed in detail with the patient that any interventional pain procedure is associated with potential risks.  The procedure may include an injection of steroids and potentially other medications (local anesthetic and normal saline) into the epidural space or surrounding tissue of the spine.  There are significant risks of this procedure which include and are not limited to infection, bleeding, worsening pain, dural puncture leading to postdural puncture headache, nerve damage, spinal cord injury, paralysis, stroke, and death.  \par \par There is a chance that the procedure does not improve their pain.  \par \par There are risks associated with the steroid being absorbed into the body systemically.  These include dysphoria, difficulty sleeping, mood swings and personality changes.  Premenopausal women may notice an irregularity in her menstrual cycle for 2-3 months following the injection.  Steroids can specifically affect patients with hypertension, diabetes, and peptic ulcers.  The procedure may cause a temporary increase in blood pressure and blood pressure, and may adversely affect a peptic ulcer.  Other, more rare complications, include avascular necrosis of joints, glaucoma and worsening of osteoporosis. \par \par I have discussed the risks of the procedure at length with the patient, and the potential benefits of pain relief.  I have offered alternatives to the procedure.  All questions were answered.  \par \par The patient expressed understanding and wishes to proceed with the procedure.\par \par 	>Regarding COVID19 Pandemic: \par \par Any planned interventional pain procedure are scheduled because further delay may cause harm or negative outcome to patient.  The goal in performing this procedure is to avoid deterioration of function, emergency room visits (which increases exposure) and reliance on opioids.  \par \par r/b/a discussed with patient, lack of evidence to conclusively determine whether pain management procedures have any positive or negative impact on the possibility of samaria the virus and/or development of any sequelae. \par \par Patient counselled regarding timing steroid based intervention 2 weeks before or after COVID-19 vaccine administration to avoid any interaction or affect on efficacy of vaccination\par \par Patient demonstrates understanding\par \par Informed patient that risks associated with the COVID-19 infection.  Informed patient steps taken to limit the risks.  We are implementing safety precautions and following protocols consistent with the CDC and state recommendations. All patients and staff will be checked for fever or signs of illness upon entry to the facility. We will limit our steroid dose to the lowest effective therapeutic dose or in some cases steroids will not be injected at all. \par \par Patient agrees to proceed\par \par >> Conclusion\par \par The above diagnosis and treatment plan is medically reasonable and necessary based on the patient encounter \par There were no barriers to communication.\par Informed patient that I would be available for any additional questions.\par Patient was instructed to call with any worsening symptoms including severe pain, new numbness/weakness, or changes in the bowel/bladder function. \par Discussed role of nsaids in pain management and all relevant risks, if patient is continuing to require after 4 weeks the patient should f/u for alternative treatment. \par Instructed patient to maintain pain diary to monitor pain level, mobility, and function.\par \par \par

## 2022-01-24 DIAGNOSIS — Z12.39 ENCOUNTER FOR OTHER SCREENING FOR MALIGNANT NEOPLASM OF BREAST: ICD-10-CM

## 2022-02-10 ENCOUNTER — APPOINTMENT (OUTPATIENT)
Dept: PAIN MANAGEMENT | Facility: HOSPITAL | Age: 52
End: 2022-02-10

## 2022-02-10 ENCOUNTER — RESULT REVIEW (OUTPATIENT)
Age: 52
End: 2022-02-10

## 2022-03-11 ENCOUNTER — APPOINTMENT (OUTPATIENT)
Dept: PAIN MANAGEMENT | Facility: CLINIC | Age: 52
End: 2022-03-11
Payer: COMMERCIAL

## 2022-03-11 VITALS
WEIGHT: 201 LBS | OXYGEN SATURATION: 99 % | HEIGHT: 61 IN | HEART RATE: 94 BPM | DIASTOLIC BLOOD PRESSURE: 99 MMHG | SYSTOLIC BLOOD PRESSURE: 156 MMHG | BODY MASS INDEX: 37.95 KG/M2 | RESPIRATION RATE: 18 BRPM

## 2022-03-11 PROCEDURE — 99214 OFFICE O/P EST MOD 30 MIN: CPT

## 2022-03-11 NOTE — ASSESSMENT
[FreeTextEntry1] : >> Imaging and Other Studies\par \par I personally reviewed the relevant imaging.  Discussed and explained to patient the likely source of pathology and pain.  Questions answered. MRI \par \par >> Therapy and Other Modalities\par \par PT - referral provided\par \par >> Medications\par \par acetaminophen 650mg q8h prn pain (caution <3g daily)\par  \par >> Interventions\par \par Significant component of back and leg pain likely secondary to lumbar spinal stenosis demonstrated on MRI LS.  Can consider further intervention if right sided pain worsens. Continue PT for now. \par \par >> Consults\par \par >> Discussion of Risks/Benefits/Alternatives\par \par 	>Regarding any scheduled procedures:\par \par I have discussed in detail with the patient that any interventional pain procedure is associated with potential risks.  The procedure may include an injection of steroids and potentially other medications (local anesthetic and normal saline) into the epidural space or surrounding tissue of the spine.  There are significant risks of this procedure which include and are not limited to infection, bleeding, worsening pain, dural puncture leading to postdural puncture headache, nerve damage, spinal cord injury, paralysis, stroke, and death.  \par \par There is a chance that the procedure does not improve their pain.  \par \par There are risks associated with the steroid being absorbed into the body systemically.  These include dysphoria, difficulty sleeping, mood swings and personality changes.  Premenopausal women may notice an irregularity in her menstrual cycle for 2-3 months following the injection.  Steroids can specifically affect patients with hypertension, diabetes, and peptic ulcers.  The procedure may cause a temporary increase in blood pressure and blood pressure, and may adversely affect a peptic ulcer.  Other, more rare complications, include avascular necrosis of joints, glaucoma and worsening of osteoporosis. \par \par I have discussed the risks of the procedure at length with the patient, and the potential benefits of pain relief.  I have offered alternatives to the procedure.  All questions were answered.  \par \par The patient expressed understanding and wishes to proceed with the procedure.\par \par 	>Regarding COVID19 Pandemic: \par \par Any planned interventional pain procedure are scheduled because further delay may cause harm or negative outcome to patient.  The goal in performing this procedure is to avoid deterioration of function, emergency room visits (which increases exposure) and reliance on opioids.  \par \par r/b/a discussed with patient, lack of evidence to conclusively determine whether pain management procedures have any positive or negative impact on the possibility of samaria the virus and/or development of any sequelae. \par \par Patient counselled regarding timing steroid based intervention 2 weeks before or after COVID-19 vaccine administration to avoid any interaction or affect on efficacy of vaccination\par \par Patient demonstrates understanding\par \par Informed patient that risks associated with the COVID-19 infection.  Informed patient steps taken to limit the risks.  We are implementing safety precautions and following protocols consistent with the CDC and state recommendations. All patients and staff will be checked for fever or signs of illness upon entry to the facility. We will limit our steroid dose to the lowest effective therapeutic dose or in some cases steroids will not be injected at all. \par \par Patient agrees to proceed\par \par >> Conclusion\par \par The above diagnosis and treatment plan is medically reasonable and necessary based on the patient encounter \par There were no barriers to communication.\par Informed patient that I would be available for any additional questions.\par Patient was instructed to call with any worsening symptoms including severe pain, new numbness/weakness, or changes in the bowel/bladder function. \par Discussed role of nsaids in pain management and all relevant risks, if patient is continuing to require after 4 weeks the patient should f/u for alternative treatment. \par Instructed patient to maintain pain diary to monitor pain level, mobility, and function.\par \par \par

## 2022-03-11 NOTE — HISTORY OF PRESENT ILLNESS
[___ mths] : [unfilled] month(s) ago [Paroxysmal] : paroxysmal [5] : a maximum pain level of 5/10 [Sharp] : sharp [Aching] : aching [Throbbing] : throbbing [Burning] : burning [Shooting] : shooting [Sitting] : sitting [Medications] : medications [Heat] : heat [FreeTextEntry1] : Interval Note:\par \par sp  L4-5 interlaminar epidural steroid injection on 2/10/22. Reports 75% improvement in symptoms. Notes on 2 occasions had left lower extremity cramps to shins overnight that resolved with walking and hydration. Since injection has noticed new right hip pain. Does not limit function or ability to complete ADLs. Interested in returning to the gym. Denies any additional weakness, numbness, bowel/bladder dysfunction.  \par \par \par HPI\par \par Ms. TRE YOUNG is a 51 year F with pmhx of sp bilateral partial knee replacement presents with left back and anterolateral thigh pain for 6 months after twisting.  Pain is so bad that patient finds it difficult to perform adls and ambulate. denies any worsening numbness, weakness, bowel/bladder dysfunction. \par \par \par Previous and current pain medications/doses/effects:\par \par tylenol without improvement\par \par Previous Pain Treatments:\par \par PT without improvement\par \par Previous Pain Injections:\par \par L4-5 interlaminar epidural steroid injection 2/10/22\par Facet nerve block\par SIJ injection\par \par Previous Diagnostic Studies/Images:\par \par MRI LS 10/21\par \par  The paraspinal musculature including the psoas muscle, multifidus muscles, and \par  immediate para axial soft tissues appear within range of normal without \par  evidence of mass or collection. \par \par  There is a maintenance of the normal lumbar lordosis. There is fairly uniform \par  marrow signal on all sequences. \par \par  The spinal canal is patent without detectable intradural or extradural mass or \par  collection. \par \par  At L5-S1 \par  There is disc desiccation and bulging of the outer annular fibers \par  asymmetrically prominent to the left mildly effacing the left lateral recess \par  and with a shallow left foraminal bulge. \par \par  At L4-L5 \par  There is disc desiccation and circumferential disc bulging asymmetrically \par  pronounced to the left. There is a small left foraminal disc protrusion best \par  seen on axial image 14 series 7 and sagittal image 7 series 6 with a tiny \par  cranially extruded fragment. \par \par  Bony productive changes associated with facet arthropathy are present at this \par  level. \par \par  At L3-L4 \par  There is disc desiccation but no focal disc protrusion. \par \par  At L2-L3 \par  The disc appears intact. There is no significant central or foraminal \par  stenosis. \par \par  At L1-L2 \par  The disc appears intact. There is no significant central or foraminal \par  stenosis. \par \par  At T12-L1 there is disc desiccation loss of intervertebral disc space height. \par  A shallow right foraminal disc protrusion is present. \par \par  IMPRESSION: \par \par  Shallow right foraminal disc protrusion at T12-L1 \par \par  Small left foraminal disc protrusion with tiny cranially extruded disc \par  fragment L4-L5 \par \par  Circumferential bulging of the L5-S1 disc asymmetrically prominent to the left \par  includes a shallow left foraminal disc bulge \par \par \par  [FreeTextEntry7] : Lower back pain , more on the left side  [FreeTextEntry4] : PT

## 2022-03-11 NOTE — PHYSICAL EXAM
[Normal muscle bulk without asymmetry] : normal muscle bulk without asymmetry [Sacroiliac tenderness] : sacroiliac tenderness [Normal] : Gait: normal [SI Provacative Testing] : positive SI Provacative Testing [] : Motor: [LE Motor Strength NL] : Motor strength of the bilateral lower extremities was normal [Santos's Test] : negative Santos's Test

## 2022-03-23 ENCOUNTER — RESULT REVIEW (OUTPATIENT)
Age: 52
End: 2022-03-23

## 2022-03-30 ENCOUNTER — RESULT REVIEW (OUTPATIENT)
Age: 52
End: 2022-03-30

## 2022-04-08 ENCOUNTER — APPOINTMENT (OUTPATIENT)
Dept: PAIN MANAGEMENT | Facility: CLINIC | Age: 52
End: 2022-04-08
Payer: COMMERCIAL

## 2022-04-08 VITALS
SYSTOLIC BLOOD PRESSURE: 137 MMHG | BODY MASS INDEX: 37.95 KG/M2 | HEIGHT: 61 IN | WEIGHT: 201 LBS | TEMPERATURE: 98 F | DIASTOLIC BLOOD PRESSURE: 84 MMHG

## 2022-04-08 PROCEDURE — 99214 OFFICE O/P EST MOD 30 MIN: CPT

## 2022-04-08 NOTE — ASSESSMENT
[FreeTextEntry1] : >> Imaging and Other Studies\par \par I personally reviewed the relevant imaging.  Discussed and explained to patient the likely source of pathology and pain.  Questions answered. MRI \par \par >> Therapy and Other Modalities\par \par PT - referral provided\par \par >> Medications\par \par acetaminophen 650mg q8h prn pain (caution <3g daily)\par  \par >> Interventions\par \par Significant component of back and leg pain likely secondary to lumbar spinal stenosis demonstrated on MRI LS.  Can consider further intervention if right sided pain worsens. Continue PT for now. \par \par >> Consults\par \par Palpable superficial left thigh mass. Has been present for a long time as per patient. f/u with PCP recommended. Patient to see PCP in 2 months. No need for imaging at this time given no oncologic history or focal symptoms, changes in size/symptoms, and given pain better post epidural injection which is evident pain originates from spine.\par \par >> Discussion of Risks/Benefits/Alternatives\par \par 	>Regarding any scheduled procedures:\par \par I have discussed in detail with the patient that any interventional pain procedure is associated with potential risks.  The procedure may include an injection of steroids and potentially other medications (local anesthetic and normal saline) into the epidural space or surrounding tissue of the spine.  There are significant risks of this procedure which include and are not limited to infection, bleeding, worsening pain, dural puncture leading to postdural puncture headache, nerve damage, spinal cord injury, paralysis, stroke, and death.  \par \par There is a chance that the procedure does not improve their pain.  \par \par There are risks associated with the steroid being absorbed into the body systemically.  These include dysphoria, difficulty sleeping, mood swings and personality changes.  Premenopausal women may notice an irregularity in her menstrual cycle for 2-3 months following the injection.  Steroids can specifically affect patients with hypertension, diabetes, and peptic ulcers.  The procedure may cause a temporary increase in blood pressure and blood pressure, and may adversely affect a peptic ulcer.  Other, more rare complications, include avascular necrosis of joints, glaucoma and worsening of osteoporosis. \par \par I have discussed the risks of the procedure at length with the patient, and the potential benefits of pain relief.  I have offered alternatives to the procedure.  All questions were answered.  \par \par The patient expressed understanding and wishes to proceed with the procedure.\par \par 	>Regarding COVID19 Pandemic: \par \par Any planned interventional pain procedure are scheduled because further delay may cause harm or negative outcome to patient.  The goal in performing this procedure is to avoid deterioration of function, emergency room visits (which increases exposure) and reliance on opioids.  \par \par r/b/a discussed with patient, lack of evidence to conclusively determine whether pain management procedures have any positive or negative impact on the possibility of samaria the virus and/or development of any sequelae. \par \par Patient counselled regarding timing steroid based intervention 2 weeks before or after COVID-19 vaccine administration to avoid any interaction or affect on efficacy of vaccination\par \par Patient demonstrates understanding\par \par Informed patient that risks associated with the COVID-19 infection.  Informed patient steps taken to limit the risks.  We are implementing safety precautions and following protocols consistent with the CDC and state recommendations. All patients and staff will be checked for fever or signs of illness upon entry to the facility. We will limit our steroid dose to the lowest effective therapeutic dose or in some cases steroids will not be injected at all. \par \par Patient agrees to proceed\par \par >> Conclusion\par \par The above diagnosis and treatment plan is medically reasonable and necessary based on the patient encounter \par There were no barriers to communication.\par Informed patient that I would be available for any additional questions.\par Patient was instructed to call with any worsening symptoms including severe pain, new numbness/weakness, or changes in the bowel/bladder function. \par Discussed role of nsaids in pain management and all relevant risks, if patient is continuing to require after 4 weeks the patient should f/u for alternative treatment. \par Instructed patient to maintain pain diary to monitor pain level, mobility, and function.\par \par \par

## 2022-04-08 NOTE — HISTORY OF PRESENT ILLNESS
[4] : 3. What number best describes how, during the past week, pain has interfered with your general activity? 4/10 pain [___ mths] : [unfilled] month(s) ago [Paroxysmal] : paroxysmal [5] : a maximum pain level of 5/10 [Sharp] : sharp [Aching] : aching [Throbbing] : throbbing [Burning] : burning [Shooting] : shooting [Sitting] : sitting [Medications] : medications [Heat] : heat [FreeTextEntry1] : Interval Note:\par \par sp  L4-5 interlaminar epidural steroid injection on 2/10/22. Lower back and leg pain continues to improve. No longer getting leg cramps or right hip pain. Has had a left thigh bump near hip for years and notices it hurts from time to time with increased activity. Wondering if it was the reason for all her pain. Otherwise doing well. Walking for exercise and mood improvement since she cannot go to the gym. Finding it difficult to make it PT at this time due to scheduling but doing home exercises as directed by physical therapy. Denies any additional weakness, numbness, bowel/bladder dysfunction.  \par \par \par HPI\par \par Ms. TRE YOUNG is a 51 year F with pmhx of sp bilateral partial knee replacement presents with left back and anterolateral thigh pain for 6 months after twisting.  Pain is so bad that patient finds it difficult to perform adls and ambulate. denies any worsening numbness, weakness, bowel/bladder dysfunction. \par \par \par Previous and current pain medications/doses/effects:\par \par tylenol without improvement\par \par Previous Pain Treatments:\par \par PT without improvement\par \par Previous Pain Injections:\par \par L4-5 interlaminar epidural steroid injection 2/10/22\par Facet nerve block\par SIJ injection\par \par Previous Diagnostic Studies/Images:\par \par MRI LS 10/21\par \par  The paraspinal musculature including the psoas muscle, multifidus muscles, and \par  immediate para axial soft tissues appear within range of normal without \par  evidence of mass or collection. \par \par  There is a maintenance of the normal lumbar lordosis. There is fairly uniform \par  marrow signal on all sequences. \par \par  The spinal canal is patent without detectable intradural or extradural mass or \par  collection. \par \par  At L5-S1 \par  There is disc desiccation and bulging of the outer annular fibers \par  asymmetrically prominent to the left mildly effacing the left lateral recess \par  and with a shallow left foraminal bulge. \par \par  At L4-L5 \par  There is disc desiccation and circumferential disc bulging asymmetrically \par  pronounced to the left. There is a small left foraminal disc protrusion best \par  seen on axial image 14 series 7 and sagittal image 7 series 6 with a tiny \par  cranially extruded fragment. \par \par  Bony productive changes associated with facet arthropathy are present at this \par  level. \par \par  At L3-L4 \par  There is disc desiccation but no focal disc protrusion. \par \par  At L2-L3 \par  The disc appears intact. There is no significant central or foraminal \par  stenosis. \par \par  At L1-L2 \par  The disc appears intact. There is no significant central or foraminal \par  stenosis. \par \par  At T12-L1 there is disc desiccation loss of intervertebral disc space height. \par  A shallow right foraminal disc protrusion is present. \par \par  IMPRESSION: \par \par  Shallow right foraminal disc protrusion at T12-L1 \par \par  Small left foraminal disc protrusion with tiny cranially extruded disc \par  fragment L4-L5 \par \par  Circumferential bulging of the L5-S1 disc asymmetrically prominent to the left \par  includes a shallow left foraminal disc bulge \par \par \par  [FreeTextEntry2] : 11 [FreeTextEntry7] : Lower back pain , more on the left side  [FreeTextEntry4] : PT

## 2022-06-03 ENCOUNTER — APPOINTMENT (OUTPATIENT)
Dept: INTERNAL MEDICINE | Facility: CLINIC | Age: 52
End: 2022-06-03
Payer: COMMERCIAL

## 2022-06-03 VITALS — HEIGHT: 61 IN | TEMPERATURE: 98 F | BODY MASS INDEX: 37.76 KG/M2 | WEIGHT: 200 LBS

## 2022-06-03 VITALS — DIASTOLIC BLOOD PRESSURE: 80 MMHG | SYSTOLIC BLOOD PRESSURE: 138 MMHG

## 2022-06-03 DIAGNOSIS — B35.3 TINEA PEDIS: ICD-10-CM

## 2022-06-03 DIAGNOSIS — J45.40 MODERATE PERSISTENT ASTHMA, UNCOMPLICATED: ICD-10-CM

## 2022-06-03 DIAGNOSIS — J45.909 UNSPECIFIED ASTHMA, UNCOMPLICATED: ICD-10-CM

## 2022-06-03 DIAGNOSIS — Z23 ENCOUNTER FOR IMMUNIZATION: ICD-10-CM

## 2022-06-03 PROCEDURE — G0009: CPT | Mod: 59

## 2022-06-03 PROCEDURE — 99396 PREV VISIT EST AGE 40-64: CPT | Mod: 25

## 2022-06-03 PROCEDURE — 36415 COLL VENOUS BLD VENIPUNCTURE: CPT

## 2022-06-03 PROCEDURE — 90677 PCV20 VACCINE IM: CPT

## 2022-06-03 RX ORDER — ALBUTEROL SULFATE 90 UG/1
108 (90 BASE) INHALANT RESPIRATORY (INHALATION)
Qty: 1 | Refills: 3 | Status: ACTIVE | COMMUNITY
Start: 2022-06-03 | End: 1900-01-01

## 2022-06-03 RX ORDER — CYCLOBENZAPRINE HYDROCHLORIDE 10 MG/1
10 TABLET, FILM COATED ORAL
Qty: 20 | Refills: 0 | Status: COMPLETED | COMMUNITY
Start: 2021-09-13 | End: 2022-06-03

## 2022-06-03 RX ORDER — FLUTICASONE PROPIONATE 50 UG/1
50 SPRAY, METERED NASAL
Refills: 0 | Status: DISCONTINUED | COMMUNITY
Start: 2021-05-28 | End: 2022-06-03

## 2022-06-03 RX ORDER — PREDNISONE 20 MG/1
20 TABLET ORAL
Qty: 10 | Refills: 0 | Status: COMPLETED | COMMUNITY
Start: 2021-09-07 | End: 2022-06-03

## 2022-06-03 RX ORDER — NAPROXEN 500 MG/1
500 TABLET ORAL TWICE DAILY
Qty: 20 | Refills: 0 | Status: COMPLETED | COMMUNITY
Start: 2021-09-07 | End: 2022-06-03

## 2022-06-03 NOTE — HEALTH RISK ASSESSMENT
[Very Good] : ~his/her~  mood as very good [Yes] : Yes [Monthly or less (1 pt)] : Monthly or less (1 point) [1 or 2 (0 pts)] : 1 or 2 (0 points) [Never (0 pts)] : Never (0 points) [No] : In the past 12 months have you used drugs other than those required for medical reasons? No [No falls in past year] : Patient reported no falls in the past year [0] : 1) Little interest or pleasure doing things: Not at all (0) [1] : 2) Feeling down, depressed, or hopeless for several days (1) [Patient reported mammogram was normal] : Patient reported mammogram was normal [Patient reported colonoscopy was abnormal] : Patient reported colonoscopy was abnormal [Hepatitis C test offered] : Hepatitis C test offered [With Family] : lives with family [# of Members in Household ___] :  household currently consist of [unfilled] member(s) [Employed] : employed [High School] : high school [] :  [Feels Safe at Home] : Feels safe at home [Reports normal functional visual acuity (ie: able to read med bottle)] : Reports normal functional visual acuity [Smoke Detector] : smoke detector [Carbon Monoxide Detector] : carbon monoxide detector [Seat Belt] :  uses seat belt [Sunscreen] : uses sunscreen [Never] : Never [PHQ-2 Negative - No further assessment needed] : PHQ-2 Negative - No further assessment needed [HIV Test offered] : HIV Test offered [# Of Children ___] : has [unfilled] children [Patient/Caregiver not ready to engage] : , patient/caregiver not ready to engage [Patient reported PAP Smear was normal] : Patient reported PAP Smear was normal [de-identified] : rare [Audit-CScore] : 1 [de-identified] : gym 3-4 x week  [de-identified] : well balanced  [[id number]] : 1 [Reports changes in hearing] : Reports no changes in hearing [Reports changes in vision] : Reports no changes in vision [Reports changes in dental health] : Reports no changes in dental health [Guns at Home] : no guns at home [Safety elements used in home] : no safety elements used in home [Travel to Developing Areas] : does not  travel to developing areas [Caregiver Concerns] : does not have caregiver concerns [MammogramDate] : 3/2022 [PapSmearDate] : 11/2021 [ColonoscopyDate] : 9/26/2019 [ColonoscopyComments] : due 2024 [FreeTextEntry2] :  @ [employer]  [de-identified] : last exam 3/2022 [de-identified] : last exam 4/2022 [AdvancecareDate] : 6/2022

## 2022-06-03 NOTE — HISTORY OF PRESENT ILLNESS
[FreeTextEntry1] : physical [de-identified] : Patient is a 52F with obesity, knee arthritis (s/p bilateral partial knee replacement), genital HSV, s/p sleeve gastrectomy presents today for annual exam \par Recently with severe back pain. Initially saw  in [facility] [place] then transferred to Dr. Roy\marlene (Lumbar bulging discs x 5, sacroliac joint dysfunction) has improved overall s/p 3 shots\par cannot lift anything heavy, but is exercising at the gym 3x/week elliptical, treadmill x 1 hour session (HR 150s)\par

## 2022-06-13 ENCOUNTER — TRANSCRIPTION ENCOUNTER (OUTPATIENT)
Age: 52
End: 2022-06-13

## 2022-06-13 LAB
25(OH)D3 SERPL-MCNC: 42.5 NG/ML
ALBUMIN SERPL ELPH-MCNC: 4.8 G/DL
ALP BLD-CCNC: 69 U/L
ALT SERPL-CCNC: 17 U/L
ANION GAP SERPL CALC-SCNC: 15 MMOL/L
AST SERPL-CCNC: 19 U/L
BASOPHILS # BLD AUTO: 0.06 K/UL
BASOPHILS NFR BLD AUTO: 0.8 %
BILIRUB SERPL-MCNC: 0.2 MG/DL
BUN SERPL-MCNC: 14 MG/DL
CALCIUM SERPL-MCNC: 9.8 MG/DL
CCP AB SER IA-ACNC: <8 UNITS
CHLORIDE SERPL-SCNC: 103 MMOL/L
CHOLEST SERPL-MCNC: 202 MG/DL
CO2 SERPL-SCNC: 26 MMOL/L
CREAT SERPL-MCNC: 0.78 MG/DL
CRP SERPL-MCNC: 7 MG/L
EGFR: 91 ML/MIN/1.73M2
EOSINOPHIL # BLD AUTO: 0.16 K/UL
EOSINOPHIL NFR BLD AUTO: 2.2 %
ERYTHROCYTE [SEDIMENTATION RATE] IN BLOOD BY WESTERGREN METHOD: 33 MM/HR
ESTIMATED AVERAGE GLUCOSE: 100 MG/DL
GLUCOSE SERPL-MCNC: 89 MG/DL
HBA1C MFR BLD HPLC: 5.1 %
HCT VFR BLD CALC: 44 %
HDLC SERPL-MCNC: 61 MG/DL
HGB BLD-MCNC: 13.5 G/DL
IMM GRANULOCYTES NFR BLD AUTO: 0.3 %
LDLC SERPL CALC-MCNC: 113 MG/DL
LYMPHOCYTES # BLD AUTO: 1.93 K/UL
LYMPHOCYTES NFR BLD AUTO: 26.4 %
MAN DIFF?: NORMAL
MCHC RBC-ENTMCNC: 29.9 PG
MCHC RBC-ENTMCNC: 30.7 GM/DL
MCV RBC AUTO: 97.6 FL
MONOCYTES # BLD AUTO: 0.39 K/UL
MONOCYTES NFR BLD AUTO: 5.3 %
NEUTROPHILS # BLD AUTO: 4.75 K/UL
NEUTROPHILS NFR BLD AUTO: 65 %
NONHDLC SERPL-MCNC: 141 MG/DL
PLATELET # BLD AUTO: 318 K/UL
POTASSIUM SERPL-SCNC: 4.7 MMOL/L
PROT SERPL-MCNC: 7.4 G/DL
RBC # BLD: 4.51 M/UL
RBC # FLD: 13.2 %
RF+CCP IGG SER-IMP: NEGATIVE
RHEUMATOID FACT SER QL: <10 IU/ML
SODIUM SERPL-SCNC: 144 MMOL/L
TRIGL SERPL-MCNC: 142 MG/DL
TSH SERPL-ACNC: 3.09 UIU/ML
WBC # FLD AUTO: 7.31 K/UL

## 2022-06-30 ENCOUNTER — TRANSCRIPTION ENCOUNTER (OUTPATIENT)
Age: 52
End: 2022-06-30

## 2022-06-30 LAB — HLA-B27 RELATED AG QL: NEGATIVE

## 2022-07-06 ENCOUNTER — APPOINTMENT (OUTPATIENT)
Dept: PAIN MANAGEMENT | Facility: CLINIC | Age: 52
End: 2022-07-06

## 2022-07-06 VITALS
WEIGHT: 200 LBS | TEMPERATURE: 98 F | SYSTOLIC BLOOD PRESSURE: 143 MMHG | DIASTOLIC BLOOD PRESSURE: 97 MMHG | HEIGHT: 61 IN | BODY MASS INDEX: 37.76 KG/M2

## 2022-07-06 PROCEDURE — 99214 OFFICE O/P EST MOD 30 MIN: CPT

## 2022-07-06 NOTE — HISTORY OF PRESENT ILLNESS
[7] : 3. What number best describes how, during the past week, pain has interfered with your general activity? 7/10 pain [___ mths] : [unfilled] month(s) ago [Paroxysmal] : paroxysmal [5] : a maximum pain level of 5/10 [Sharp] : sharp [Aching] : aching [Throbbing] : throbbing [Burning] : burning [Shooting] : shooting [Sitting] : sitting [Medications] : medications [Heat] : heat [FreeTextEntry1] : Interval Note:\par \par Patient reports that over the last few weeks her pain has returned over bilateral lower back with some radiation to left thigh, worse after exercise and activity.  Also painful when she lies down at night to sleep.  Pain is so bad that patient finds it difficult to perform adls and ambulate. Denies any additional weakness, numbness, bowel/bladder dysfunction.  \par \par \par HPI\par \par Ms. TRE YOUNG is a 52 year F with pmhx of sp bilateral partial knee replacement presents with left back and anterolateral thigh pain for 6 months after twisting.  Pain is so bad that patient finds it difficult to perform adls and ambulate. denies any worsening numbness, weakness, bowel/bladder dysfunction. \par \par \par Previous and current pain medications/doses/effects:\par \par tylenol without improvement\par \par Previous Pain Treatments:\par \par PT without improvement\par \par Previous Pain Injections:\par \par L4-5 interlaminar epidural steroid injection 2/10/22\par Facet nerve block\par SIJ injection\par \par Previous Diagnostic Studies/Images:\par \par MRI LS 10/21\par \par  The paraspinal musculature including the psoas muscle, multifidus muscles, and \par  immediate para axial soft tissues appear within range of normal without \par  evidence of mass or collection. \par \par  There is a maintenance of the normal lumbar lordosis. There is fairly uniform \par  marrow signal on all sequences. \par \par  The spinal canal is patent without detectable intradural or extradural mass or \par  collection. \par \par  At L5-S1 \par  There is disc desiccation and bulging of the outer annular fibers \par  asymmetrically prominent to the left mildly effacing the left lateral recess \par  and with a shallow left foraminal bulge. \par \par  At L4-L5 \par  There is disc desiccation and circumferential disc bulging asymmetrically \par  pronounced to the left. There is a small left foraminal disc protrusion best \par  seen on axial image 14 series 7 and sagittal image 7 series 6 with a tiny \par  cranially extruded fragment. \par \par  Bony productive changes associated with facet arthropathy are present at this \par  level. \par \par  At L3-L4 \par  There is disc desiccation but no focal disc protrusion. \par \par  At L2-L3 \par  The disc appears intact. There is no significant central or foraminal \par  stenosis. \par \par  At L1-L2 \par  The disc appears intact. There is no significant central or foraminal \par  stenosis. \par \par  At T12-L1 there is disc desiccation loss of intervertebral disc space height. \par  A shallow right foraminal disc protrusion is present. \par \par  IMPRESSION: \par \par  Shallow right foraminal disc protrusion at T12-L1 \par \par  Small left foraminal disc protrusion with tiny cranially extruded disc \par  fragment L4-L5 \par \par  Circumferential bulging of the L5-S1 disc asymmetrically prominent to the left \par  includes a shallow left foraminal disc bulge \par \par \par  [FreeTextEntry7] : Lower back pain , more on the left side  [FreeTextEntry4] : PT  [FreeTextEntry2] : 21

## 2022-07-06 NOTE — ASSESSMENT
[FreeTextEntry1] : >> Imaging and Other Studies\par \par I personally reviewed the relevant imaging.  Discussed and explained to patient the likely source of pathology and pain.  Questions answered. MRI \par \par >> Therapy and Other Modalities\par \par home exercises\par \par >> Medications\par \par acetaminophen 650mg q8h prn pain (caution <3g daily)\par  \par >> Interventions\par \par Significant component of back and leg pain likely secondary to lumbar discogenic pain demonstrated on MRI LS.  \par \par given efficacy of previous intervention that is >80% improvement in pain and improved ability to perform adls, and return of pain despite conservative treatment, will schedule repeat L4-5 interlaminar epidural steroid injection r/b/a discussed\par \par may consider bilateral lumbar mbb\par \par >> Consults\par \par Palpable superficial left thigh mass. Has been present for a long time as per patient. f/u with PCP recommended. Patient to see PCP in 2 months. No need for imaging at this time given no oncologic history or focal symptoms, changes in size/symptoms, and given pain better post epidural injection which is evident pain originates from spine.\par \par >> Discussion of Risks/Benefits/Alternatives\par \par 	>Regarding any scheduled procedures:\par \par I have discussed in detail with the patient that any interventional pain procedure is associated with potential risks.  The procedure may include an injection of steroids and potentially other medications (local anesthetic and normal saline) into the epidural space or surrounding tissue of the spine.  There are significant risks of this procedure which include and are not limited to infection, bleeding, worsening pain, dural puncture leading to postdural puncture headache, nerve damage, spinal cord injury, paralysis, stroke, and death.  \par \par There is a chance that the procedure does not improve their pain.  \par \par There are risks associated with the steroid being absorbed into the body systemically.  These include dysphoria, difficulty sleeping, mood swings and personality changes.  Premenopausal women may notice an irregularity in her menstrual cycle for 2-3 months following the injection.  Steroids can specifically affect patients with hypertension, diabetes, and peptic ulcers.  The procedure may cause a temporary increase in blood pressure and blood pressure, and may adversely affect a peptic ulcer.  Other, more rare complications, include avascular necrosis of joints, glaucoma and worsening of osteoporosis. \par \par I have discussed the risks of the procedure at length with the patient, and the potential benefits of pain relief.  I have offered alternatives to the procedure.  All questions were answered.  \par \par The patient expressed understanding and wishes to proceed with the procedure.\par \par 	>Regarding COVID19 Pandemic: \par \par Any planned interventional pain procedure are scheduled because further delay may cause harm or negative outcome to patient.  The goal in performing this procedure is to avoid deterioration of function, emergency room visits (which increases exposure) and reliance on opioids.  \par \par r/b/a discussed with patient, lack of evidence to conclusively determine whether pain management procedures have any positive or negative impact on the possibility of samaria the virus and/or development of any sequelae. \par \par Patient counselled regarding timing steroid based intervention 2 weeks before or after COVID-19 vaccine administration to avoid any interaction or affect on efficacy of vaccination\par \par Patient demonstrates understanding\par \par Informed patient that risks associated with the COVID-19 infection.  Informed patient steps taken to limit the risks.  We are implementing safety precautions and following protocols consistent with the CDC and state recommendations. All patients and staff will be checked for fever or signs of illness upon entry to the facility. We will limit our steroid dose to the lowest effective therapeutic dose or in some cases steroids will not be injected at all. \par \par Patient agrees to proceed\par \par >> Conclusion\par \par The above diagnosis and treatment plan is medically reasonable and necessary based on the patient encounter \par There were no barriers to communication.\par Informed patient that I would be available for any additional questions.\par Patient was instructed to call with any worsening symptoms including severe pain, new numbness/weakness, or changes in the bowel/bladder function. \par Discussed role of nsaids in pain management and all relevant risks, if patient is continuing to require after 4 weeks the patient should f/u for alternative treatment. \par Instructed patient to maintain pain diary to monitor pain level, mobility, and function.\par \par \par

## 2022-07-22 ENCOUNTER — APPOINTMENT (OUTPATIENT)
Dept: PAIN MANAGEMENT | Facility: HOSPITAL | Age: 52
End: 2022-07-22

## 2022-07-22 ENCOUNTER — RESULT REVIEW (OUTPATIENT)
Age: 52
End: 2022-07-22

## 2022-07-22 ENCOUNTER — TRANSCRIPTION ENCOUNTER (OUTPATIENT)
Age: 52
End: 2022-07-22

## 2022-08-17 ENCOUNTER — APPOINTMENT (OUTPATIENT)
Dept: PAIN MANAGEMENT | Facility: CLINIC | Age: 52
End: 2022-08-17

## 2022-08-17 VITALS
HEIGHT: 61 IN | TEMPERATURE: 98 F | WEIGHT: 200 LBS | SYSTOLIC BLOOD PRESSURE: 119 MMHG | BODY MASS INDEX: 37.76 KG/M2 | DIASTOLIC BLOOD PRESSURE: 81 MMHG

## 2022-08-17 PROCEDURE — 99214 OFFICE O/P EST MOD 30 MIN: CPT

## 2022-08-17 NOTE — HISTORY OF PRESENT ILLNESS
[7] : 3. What number best describes how, during the past week, pain has interfered with your general activity? 7/10 pain [FreeTextEntry1] : Interval Note:\par \par sp  L4-5 interlaminar epidural steroid injection 7/22/22 with improvement in anterior leg pain, but continues to have axial back pain, worse with transition.  Pain is so bad that patient finds it difficult to perform adls and ambulate. Denies any additional weakness, numbness, bowel/bladder dysfunction.  \par \par \par HPI\par \par Ms. TRE YOUNG is a 52 year F with pmhx of sp bilateral partial knee replacement presents with left back and anterolateral thigh pain for 6 months after twisting.  Pain is so bad that patient finds it difficult to perform adls and ambulate. denies any worsening numbness, weakness, bowel/bladder dysfunction. \par \par \par Previous and current pain medications/doses/effects:\par \par tylenol without improvement\par \par Previous Pain Treatments:\par \par PT without improvement\par \par Previous Pain Injections:\par   L4-5 interlaminar epidural steroid injection 7/22/22\par L4-5 interlaminar epidural steroid injection 2/10/22\par Facet nerve block\par SIJ injection\par \par Previous Diagnostic Studies/Images:\par \par MRI LS 10/21\par \par  The paraspinal musculature including the psoas muscle, multifidus muscles, and \par  immediate para axial soft tissues appear within range of normal without \par  evidence of mass or collection. \par \par  There is a maintenance of the normal lumbar lordosis. There is fairly uniform \par  marrow signal on all sequences. \par \par  The spinal canal is patent without detectable intradural or extradural mass or \par  collection. \par \par  At L5-S1 \par  There is disc desiccation and bulging of the outer annular fibers \par  asymmetrically prominent to the left mildly effacing the left lateral recess \par  and with a shallow left foraminal bulge. \par \par  At L4-L5 \par  There is disc desiccation and circumferential disc bulging asymmetrically \par  pronounced to the left. There is a small left foraminal disc protrusion best \par  seen on axial image 14 series 7 and sagittal image 7 series 6 with a tiny \par  cranially extruded fragment. \par \par  Bony productive changes associated with facet arthropathy are present at this \par  level. \par \par  At L3-L4 \par  There is disc desiccation but no focal disc protrusion. \par \par  At L2-L3 \par  The disc appears intact. There is no significant central or foraminal \par  stenosis. \par \par  At L1-L2 \par  The disc appears intact. There is no significant central or foraminal \par  stenosis. \par \par  At T12-L1 there is disc desiccation loss of intervertebral disc space height. \par  A shallow right foraminal disc protrusion is present. \par \par  IMPRESSION: \par \par  Shallow right foraminal disc protrusion at T12-L1 \par \par  Small left foraminal disc protrusion with tiny cranially extruded disc \par  fragment L4-L5 \par \par  Circumferential bulging of the L5-S1 disc asymmetrically prominent to the left \par  includes a shallow left foraminal disc bulge \par \par \par  [FreeTextEntry2] : 21

## 2022-08-17 NOTE — ASSESSMENT
[FreeTextEntry1] : >> Imaging and Other Studies\par \par I personally reviewed the relevant imaging.  Discussed and explained to patient the likely source of pathology and pain.  Questions answered. MRI \par \par >> Therapy and Other Modalities\par \par home exercises\par \par >> Medications\par \par acetaminophen 650mg q8h prn pain (caution <3g daily)\par  \par >> Interventions\par \par Significant component of back and leg pain likely secondary to lumbar discogenic pain demonstrated on MRI LS.  \par \par given efficacy of previous intervention that is >80% improvement in pain and improved ability to perform adls, and return of pain despite conservative treatment, sp  repeat L4-5 interlaminar epidural steroid injection \par \par Significant component of axial back pain secondary to lumbar spondylosis and facet arthropathy demonstrated on MRI LS.  Pain refractory to conservative treatments.  will schedule BILATERAL L4-sacral ala diagnostic medial branch block (2 joints, 3 nerves on each side) r/b/a discussed\par \par May consider radiofreqency ablation\par \par \par >> Consults\par \par Palpable superficial left thigh mass. Has been present for a long time as per patient. f/u with PCP recommended. Patient to see PCP in 2 months. No need for imaging at this time given no oncologic history or focal symptoms, changes in size/symptoms, and given pain better post epidural injection which is evident pain originates from spine.\par \par >> Discussion of Risks/Benefits/Alternatives\par \par 	>Regarding any scheduled procedures:\par \par I have discussed in detail with the patient that any interventional pain procedure is associated with potential risks.  The procedure may include an injection of steroids and potentially other medications (local anesthetic and normal saline) into the epidural space or surrounding tissue of the spine.  There are significant risks of this procedure which include and are not limited to infection, bleeding, worsening pain, dural puncture leading to postdural puncture headache, nerve damage, spinal cord injury, paralysis, stroke, and death.  \par \par There is a chance that the procedure does not improve their pain.  \par \par There are risks associated with the steroid being absorbed into the body systemically.  These include dysphoria, difficulty sleeping, mood swings and personality changes.  Premenopausal women may notice an irregularity in her menstrual cycle for 2-3 months following the injection.  Steroids can specifically affect patients with hypertension, diabetes, and peptic ulcers.  The procedure may cause a temporary increase in blood pressure and blood pressure, and may adversely affect a peptic ulcer.  Other, more rare complications, include avascular necrosis of joints, glaucoma and worsening of osteoporosis. \par \par I have discussed the risks of the procedure at length with the patient, and the potential benefits of pain relief.  I have offered alternatives to the procedure.  All questions were answered.  \par \par The patient expressed understanding and wishes to proceed with the procedure.\par \par 	>Regarding COVID19 Pandemic: \par \par Any planned interventional pain procedure are scheduled because further delay may cause harm or negative outcome to patient.  The goal in performing this procedure is to avoid deterioration of function, emergency room visits (which increases exposure) and reliance on opioids.  \par \par r/b/a discussed with patient, lack of evidence to conclusively determine whether pain management procedures have any positive or negative impact on the possibility of samaria the virus and/or development of any sequelae. \par \par Patient counselled regarding timing steroid based intervention 2 weeks before or after COVID-19 vaccine administration to avoid any interaction or affect on efficacy of vaccination\par \par Patient demonstrates understanding\par \par Informed patient that risks associated with the COVID-19 infection.  Informed patient steps taken to limit the risks.  We are implementing safety precautions and following protocols consistent with the CDC and state recommendations. All patients and staff will be checked for fever or signs of illness upon entry to the facility. We will limit our steroid dose to the lowest effective therapeutic dose or in some cases steroids will not be injected at all. \par \par Patient agrees to proceed\par \par >> Conclusion\par \par The above diagnosis and treatment plan is medically reasonable and necessary based on the patient encounter \par There were no barriers to communication.\par Informed patient that I would be available for any additional questions.\par Patient was instructed to call with any worsening symptoms including severe pain, new numbness/weakness, or changes in the bowel/bladder function. \par Discussed role of nsaids in pain management and all relevant risks, if patient is continuing to require after 4 weeks the patient should f/u for alternative treatment. \par Instructed patient to maintain pain diary to monitor pain level, mobility, and function.\par \par \par

## 2022-09-08 ENCOUNTER — APPOINTMENT (OUTPATIENT)
Dept: PAIN MANAGEMENT | Facility: HOSPITAL | Age: 52
End: 2022-09-08

## 2022-09-08 ENCOUNTER — TRANSCRIPTION ENCOUNTER (OUTPATIENT)
Age: 52
End: 2022-09-08

## 2022-09-08 ENCOUNTER — RESULT REVIEW (OUTPATIENT)
Age: 52
End: 2022-09-08

## 2022-09-09 ENCOUNTER — APPOINTMENT (OUTPATIENT)
Dept: PAIN MANAGEMENT | Facility: CLINIC | Age: 52
End: 2022-09-09

## 2022-09-09 PROCEDURE — 99213 OFFICE O/P EST LOW 20 MIN: CPT | Mod: 95

## 2022-09-09 NOTE — ASSESSMENT
[FreeTextEntry1] : >> Imaging and Other Studies\par \par I personally reviewed the relevant imaging.  Discussed and explained to patient the likely source of pathology and pain.  Questions answered. MRI \par \par >> Therapy and Other Modalities\par \par home exercises\par \par >> Medications\par \par acetaminophen 650mg q8h prn pain (caution <3g daily)\par  \par >> Interventions\par \par Significant component of back and leg pain likely secondary to lumbar discogenic pain demonstrated on MRI LS.  \par \par given efficacy of previous intervention that is >80% improvement in pain and improved ability to perform adls, and return of pain despite conservative treatment, sp  repeat L4-5 interlaminar epidural steroid injection \par \par Significant component of axial back pain secondary to lumbar spondylosis and facet arthropathy demonstrated on MRI LS.  Pain refractory to conservative treatments.  sp BILATERAL L4-sacral ala diagnostic medial branch block (2 joints, 3 nerves on each side) with 80% improvement\par \par given efficacy of previous intervention that is >80% improvement in pain and improved ability to perform adls, and return of pain despite conservative treatment, will schedule repeat BILATERAL L4-sacral ala diagnostic medial branch block (2 joints, 3 nerves on each side) \par \par \par May consider radiofreqency ablation\par \par \par >> Consults\par \par Palpable superficial left thigh mass. Has been present for a long time as per patient. f/u with PCP recommended. Patient to see PCP in 2 months. No need for imaging at this time given no oncologic history or focal symptoms, changes in size/symptoms, and given pain better post epidural injection which is evident pain originates from spine.\par \par >> Discussion of Risks/Benefits/Alternatives\par \par 	>Regarding any scheduled procedures:\par \par I have discussed in detail with the patient that any interventional pain procedure is associated with potential risks.  The procedure may include an injection of steroids and potentially other medications (local anesthetic and normal saline) into the epidural space or surrounding tissue of the spine.  There are significant risks of this procedure which include and are not limited to infection, bleeding, worsening pain, dural puncture leading to postdural puncture headache, nerve damage, spinal cord injury, paralysis, stroke, and death.  \par \par There is a chance that the procedure does not improve their pain.  \par \par There are risks associated with the steroid being absorbed into the body systemically.  These include dysphoria, difficulty sleeping, mood swings and personality changes.  Premenopausal women may notice an irregularity in her menstrual cycle for 2-3 months following the injection.  Steroids can specifically affect patients with hypertension, diabetes, and peptic ulcers.  The procedure may cause a temporary increase in blood pressure and blood pressure, and may adversely affect a peptic ulcer.  Other, more rare complications, include avascular necrosis of joints, glaucoma and worsening of osteoporosis. \par \par I have discussed the risks of the procedure at length with the patient, and the potential benefits of pain relief.  I have offered alternatives to the procedure.  All questions were answered.  \par \par The patient expressed understanding and wishes to proceed with the procedure.\par \par 	>Regarding COVID19 Pandemic: \par \par Any planned interventional pain procedure are scheduled because further delay may cause harm or negative outcome to patient.  The goal in performing this procedure is to avoid deterioration of function, emergency room visits (which increases exposure) and reliance on opioids.  \par \par r/b/a discussed with patient, lack of evidence to conclusively determine whether pain management procedures have any positive or negative impact on the possibility of samaria the virus and/or development of any sequelae. \par \par Patient counselled regarding timing steroid based intervention 2 weeks before or after COVID-19 vaccine administration to avoid any interaction or affect on efficacy of vaccination\par \par Patient demonstrates understanding\par \par Informed patient that risks associated with the COVID-19 infection.  Informed patient steps taken to limit the risks.  We are implementing safety precautions and following protocols consistent with the CDC and state recommendations. All patients and staff will be checked for fever or signs of illness upon entry to the facility. We will limit our steroid dose to the lowest effective therapeutic dose or in some cases steroids will not be injected at all. \par \par Patient agrees to proceed\par \par >> Conclusion\par \par The above diagnosis and treatment plan is medically reasonable and necessary based on the patient encounter \par There were no barriers to communication.\par Informed patient that I would be available for any additional questions.\par Patient was instructed to call with any worsening symptoms including severe pain, new numbness/weakness, or changes in the bowel/bladder function. \par Discussed role of nsaids in pain management and all relevant risks, if patient is continuing to require after 4 weeks the patient should f/u for alternative treatment. \par Instructed patient to maintain pain diary to monitor pain level, mobility, and function.\par \par \par

## 2022-09-09 NOTE — PHYSICAL EXAM
[de-identified] : \par Constitutional: Normal, well developed, no acute distress on audio/video examination\par Eyes: Symmetric, External structures on video examination\par ENT: Lips, mucosa and tongue normal on video examination\par Oropharynx: Lips normal, symmetric, no external lesions appreciated appreciated on video examination\par Respiratory: Non-labored breathing, no audible wheezes appreciated on audio/video examination\par Vascular: No cyanosis appreciated or edema appreciated on video examination\par GI:  no jaundice appreciated on video examination\par Neurovascular: CN grossly intact on video/audio examination, alert\par MSK: Normal muscle bulk on video examination\par

## 2022-09-09 NOTE — HISTORY OF PRESENT ILLNESS
[Home] : at home, [unfilled] , at the time of the visit. [Medical Office: ([facility])___] : at the medical office located in  [Verbal consent obtained from patient] : the patient, [unfilled] [7] : 3. What number best describes how, during the past week, pain has interfered with your general activity? 7/10 pain [FreeTextEntry1] : Interval Note:\par \par sp BILATERAL L4-sacral ala diagnostic medial branch block (2 joints, 3 nerves on each side) with 80% improvement in axial back pain that is has lasted for 12 hours.  Patient reports that the pain has returned and she continues to have axial back pain, worse with transition.  Pain is so bad that patient finds it difficult to perform adls and ambulate. Denies any additional weakness, numbness, bowel/bladder dysfunction.  \par \par \par HPI\par \par Ms. TRE YOUNG is a 52 year F with pmhx of sp bilateral partial knee replacement presents with left back and anterolateral thigh pain for 6 months after twisting.  Pain is so bad that patient finds it difficult to perform adls and ambulate. denies any worsening numbness, weakness, bowel/bladder dysfunction. \par \par \par Previous and current pain medications/doses/effects:\par \par tylenol without improvement\par \par Previous Pain Treatments:\par \par PT without improvement\par \par Previous Pain Injections:\par   L4-5 interlaminar epidural steroid injection 7/22/22\par L4-5 interlaminar epidural steroid injection 2/10/22\par Facet nerve block\par SIJ injection\par \par Previous Diagnostic Studies/Images:\par \par MRI LS 10/21\par \par  The paraspinal musculature including the psoas muscle, multifidus muscles, and \par  immediate para axial soft tissues appear within range of normal without \par  evidence of mass or collection. \par \par  There is a maintenance of the normal lumbar lordosis. There is fairly uniform \par  marrow signal on all sequences. \par \par  The spinal canal is patent without detectable intradural or extradural mass or \par  collection. \par \par  At L5-S1 \par  There is disc desiccation and bulging of the outer annular fibers \par  asymmetrically prominent to the left mildly effacing the left lateral recess \par  and with a shallow left foraminal bulge. \par \par  At L4-L5 \par  There is disc desiccation and circumferential disc bulging asymmetrically \par  pronounced to the left. There is a small left foraminal disc protrusion best \par  seen on axial image 14 series 7 and sagittal image 7 series 6 with a tiny \par  cranially extruded fragment. \par \par  Bony productive changes associated with facet arthropathy are present at this \par  level. \par \par  At L3-L4 \par  There is disc desiccation but no focal disc protrusion. \par \par  At L2-L3 \par  The disc appears intact. There is no significant central or foraminal \par  stenosis. \par \par  At L1-L2 \par  The disc appears intact. There is no significant central or foraminal \par  stenosis. \par \par  At T12-L1 there is disc desiccation loss of intervertebral disc space height. \par  A shallow right foraminal disc protrusion is present. \par \par  IMPRESSION: \par \par  Shallow right foraminal disc protrusion at T12-L1 \par \par  Small left foraminal disc protrusion with tiny cranially extruded disc \par  fragment L4-L5 \par \par  Circumferential bulging of the L5-S1 disc asymmetrically prominent to the left \par  includes a shallow left foraminal disc bulge \par \par \par  [FreeTextEntry2] : 21

## 2022-09-28 ENCOUNTER — APPOINTMENT (OUTPATIENT)
Dept: PAIN MANAGEMENT | Facility: CLINIC | Age: 52
End: 2022-09-28

## 2022-09-28 VITALS
SYSTOLIC BLOOD PRESSURE: 159 MMHG | OXYGEN SATURATION: 100 % | HEIGHT: 61 IN | WEIGHT: 200 LBS | RESPIRATION RATE: 19 BRPM | BODY MASS INDEX: 37.76 KG/M2 | DIASTOLIC BLOOD PRESSURE: 85 MMHG | TEMPERATURE: 98 F

## 2022-09-28 PROCEDURE — 64493 INJ PARAVERT F JNT L/S 1 LEV: CPT | Mod: 50

## 2022-09-28 PROCEDURE — 64494 INJ PARAVERT F JNT L/S 2 LEV: CPT | Mod: 50

## 2022-09-28 NOTE — ASSESSMENT
[FreeTextEntry1] : LUMBAR MEDIAL BRANCH BLOCK BILATERAL \par \par The patient was placed in the prone position on the fluoroscopic table with a pillow under the abdomen.  Routine monitors were applied.  The back was draped in the usual sterile fashion and sterile technique was adhered to throughout the entire procedure.\par \par The [LEFT] L4-sacral ala  levels were identified under fluoroscopic guidance.  The vertebral body end plates were aligned and the junction of the superior articular process and the base of the transverse process was brought into view using an oblique angulation of the C-arm.  The skin and subcutaneous tissues were infiltrated with 1% Lidocaine.\par \par At all the levels except for the lumbosacral junction, a 25-gauge 3.5" spinal needle was inserted at the angle between the superior articular process and the base of the transverse process (after local anesthesia).  Oblique angulation was used to guide the needle into position.  The L5 median branch was identified at the lumbosacral junction and a 25-gauge 3.5" was guided fluoroscopically using AP view to the [LEFT ]lumbosacral junction.  1cc of 0.25% Bupivacaine with 1mg kenalog was injected at each level. \par \par \par The [RIGHT] L4-sacral ala  levels were identified under fluoroscopic guidance.  The vertebral body end plates were aligned and the junction of the superior articular process and the base of the transverse process was brought into view using an oblique angulation of the C-arm.  The skin and subcutaneous tissues were infiltrated with 1% Lidocaine.\par \par At all the levels except for the lumbosacral junction, a 25-gauge 3.5" spinal needle was inserted at the angle between the superior articular process and the base of the transverse process (after local anesthesia).  Oblique angulation was used to guide the needle into position.  The L5 median branch was identified at the lumbosacral junction and a 25-gauge 3.5" was guided fluoroscopically using AP view to the [RIGHT ]lumbosacral junction.  1cc of 0.25% Bupivacaine with 1mg kenalog was injected at each level. \par \par The patient tolerated the procedure well.  There was no neurological deficit post procedure.  The patient went to recovery room in stable condition.  Discharge instructions were given to the patient.\par \par

## 2022-09-29 ENCOUNTER — APPOINTMENT (OUTPATIENT)
Dept: PAIN MANAGEMENT | Facility: CLINIC | Age: 52
End: 2022-09-29

## 2022-09-29 PROCEDURE — 99212 OFFICE O/P EST SF 10 MIN: CPT | Mod: 95

## 2022-09-29 NOTE — PHYSICAL EXAM
[de-identified] : \par Constitutional: Normal, well developed, no acute distress on audio/video examination\par Eyes: Symmetric, External structures on video examination\par ENT: Lips, mucosa and tongue normal on video examination\par Oropharynx: Lips normal, symmetric, no external lesions appreciated appreciated on video examination\par Respiratory: Non-labored breathing, no audible wheezes appreciated on audio/video examination\par Vascular: No cyanosis appreciated or edema appreciated on video examination\par GI:  no jaundice appreciated on video examination\par Neurovascular: CN grossly intact on video/audio examination, alert\par MSK: Normal muscle bulk on video examination\par

## 2022-09-29 NOTE — PHYSICAL EXAM
[de-identified] : \par Constitutional: Normal, well developed, no acute distress on audio/video examination\par Eyes: Symmetric, External structures on video examination\par ENT: Lips, mucosa and tongue normal on video examination\par Oropharynx: Lips normal, symmetric, no external lesions appreciated appreciated on video examination\par Respiratory: Non-labored breathing, no audible wheezes appreciated on audio/video examination\par Vascular: No cyanosis appreciated or edema appreciated on video examination\par GI:  no jaundice appreciated on video examination\par Neurovascular: CN grossly intact on video/audio examination, alert\par MSK: Normal muscle bulk on video examination\par

## 2022-09-30 ENCOUNTER — TRANSCRIPTION ENCOUNTER (OUTPATIENT)
Age: 52
End: 2022-09-30

## 2022-09-30 RX ORDER — FLUTICASONE PROPIONATE 110 UG/1
110 AEROSOL, METERED RESPIRATORY (INHALATION) TWICE DAILY
Qty: 1 | Refills: 3 | Status: ACTIVE | COMMUNITY
Start: 2021-05-28 | End: 1900-01-01

## 2022-10-06 NOTE — HISTORY OF PRESENT ILLNESS
[Home] : at home, [unfilled] , at the time of the visit. [Medical Office: ([facility])___] : at the medical office located in  [Verbal consent obtained from patient] : the patient, [unfilled] [FreeTextEntry1] : Interval Note:\par \par sp BILATERAL L4-sacral ala diagnostic medial branch block (2 joints, 3 nerves on each side) with 80% improvement in axial back pain that is has lasted for 12 hours.  Patient reports that the pain has returned and she continues to have axial back pain, worse with transition.  Pain is so bad that patient finds it difficult to perform adls and ambulate. Denies any additional weakness, numbness, bowel/bladder dysfunction.  \par \par \par HPI\par \par Ms. TRE YOUNG is a 52 year F with pmhx of sp bilateral partial knee replacement presents with left back and anterolateral thigh pain for 6 months after twisting.  Pain is so bad that patient finds it difficult to perform adls and ambulate. denies any worsening numbness, weakness, bowel/bladder dysfunction. \par \par \par Previous and current pain medications/doses/effects:\par \par tylenol without improvement\par \par Previous Pain Treatments:\par \par PT without improvement\par \par Previous Pain Injections:\par   L4-5 interlaminar epidural steroid injection 7/22/22\par L4-5 interlaminar epidural steroid injection 2/10/22\par Facet nerve block\par SIJ injection\par \par Previous Diagnostic Studies/Images:\par \par MRI LS 10/21\par \par  The paraspinal musculature including the psoas muscle, multifidus muscles, and \par  immediate para axial soft tissues appear within range of normal without \par  evidence of mass or collection. \par \par  There is a maintenance of the normal lumbar lordosis. There is fairly uniform \par  marrow signal on all sequences. \par \par  The spinal canal is patent without detectable intradural or extradural mass or \par  collection. \par \par  At L5-S1 \par  There is disc desiccation and bulging of the outer annular fibers \par  asymmetrically prominent to the left mildly effacing the left lateral recess \par  and with a shallow left foraminal bulge. \par \par  At L4-L5 \par  There is disc desiccation and circumferential disc bulging asymmetrically \par  pronounced to the left. There is a small left foraminal disc protrusion best \par  seen on axial image 14 series 7 and sagittal image 7 series 6 with a tiny \par  cranially extruded fragment. \par \par  Bony productive changes associated with facet arthropathy are present at this \par  level. \par \par  At L3-L4 \par  There is disc desiccation but no focal disc protrusion. \par \par  At L2-L3 \par  The disc appears intact. There is no significant central or foraminal \par  stenosis. \par \par  At L1-L2 \par  The disc appears intact. There is no significant central or foraminal \par  stenosis. \par \par  At T12-L1 there is disc desiccation loss of intervertebral disc space height. \par  A shallow right foraminal disc protrusion is present. \par \par  IMPRESSION: \par \par  Shallow right foraminal disc protrusion at T12-L1 \par \par  Small left foraminal disc protrusion with tiny cranially extruded disc \par  fragment L4-L5 \par \par  Circumferential bulging of the L5-S1 disc asymmetrically prominent to the left \par  includes a shallow left foraminal disc bulge \par \par \par

## 2022-10-06 NOTE — HISTORY OF PRESENT ILLNESS
[Home] : at home, [unfilled] , at the time of the visit. [Medical Office: ([facility])___] : at the medical office located in  [Verbal consent obtained from patient] : the patient, [unfilled] [FreeTextEntry1] : Interval Note:\par \par sp BILATERAL L4-sacral ala diagnostic medial branch block (2 joints, 3 nerves on each side) with 80% improvement in axial back pain that is has lasted for 12 hours.  Patient reports that the pain has returned and she continues to have axial back pain, worse with transition.  Pain is so bad that patient finds it difficult to perform adls and ambulate. Denies any additional weakness, numbness, bowel/bladder dysfunction.  \par \par \par HPI\par \par Ms. TRE YOUNG is a 52 year F with pmhx of sp bilateral partial knee replacement presents with left back and anterolateral thigh pain for 6 months after twisting.  Pain is so bad that patient finds it difficult to perform adls and ambulate. denies any worsening numbness, weakness, bowel/bladder dysfunction. \par \par \par Previous and current pain medications/doses/effects:\par \par tylenol without improvement\par \par Previous Pain Treatments:\par \par PT without improvement\par \par Previous Pain Injections:\par   L4-5 interlaminar epidural steroid injection 7/22/22\par L4-5 interlaminar epidural steroid injection 2/10/22\par Facet nerve block\par SIJ injection\par \par Previous Diagnostic Studies/Images:\par \par MRI LS 10/21\par \par  The paraspinal musculature including the psoas muscle, multifidus muscles, and \par  immediate para axial soft tissues appear within range of normal without \par  evidence of mass or collection. \par \par  There is a maintenance of the normal lumbar lordosis. There is fairly uniform \par  marrow signal on all sequences. \par \par  The spinal canal is patent without detectable intradural or extradural mass or \par  collection. \par \par  At L5-S1 \par  There is disc desiccation and bulging of the outer annular fibers \par  asymmetrically prominent to the left mildly effacing the left lateral recess \par  and with a shallow left foraminal bulge. \par \par  At L4-L5 \par  There is disc desiccation and circumferential disc bulging asymmetrically \par  pronounced to the left. There is a small left foraminal disc protrusion best \par  seen on axial image 14 series 7 and sagittal image 7 series 6 with a tiny \par  cranially extruded fragment. \par \par  Bony productive changes associated with facet arthropathy are present at this \par  level. \par \par  At L3-L4 \par  There is disc desiccation but no focal disc protrusion. \par \par  At L2-L3 \par  The disc appears intact. There is no significant central or foraminal \par  stenosis. \par \par  At L1-L2 \par  The disc appears intact. There is no significant central or foraminal \par  stenosis. \par \par  At T12-L1 there is disc desiccation loss of intervertebral disc space height. \par  A shallow right foraminal disc protrusion is present. \par \par  IMPRESSION: \par \par  Shallow right foraminal disc protrusion at T12-L1 \par \par  Small left foraminal disc protrusion with tiny cranially extruded disc \par  fragment L4-L5 \par \par  Circumferential bulging of the L5-S1 disc asymmetrically prominent to the left \par  includes a shallow left foraminal disc bulge \par \par \par

## 2022-10-14 ENCOUNTER — TRANSCRIPTION ENCOUNTER (OUTPATIENT)
Age: 52
End: 2022-10-14

## 2022-10-14 ENCOUNTER — APPOINTMENT (OUTPATIENT)
Dept: PAIN MANAGEMENT | Facility: HOSPITAL | Age: 52
End: 2022-10-14

## 2022-10-14 ENCOUNTER — RESULT REVIEW (OUTPATIENT)
Age: 52
End: 2022-10-14

## 2022-10-25 ENCOUNTER — APPOINTMENT (OUTPATIENT)
Dept: ENDOCRINOLOGY | Facility: CLINIC | Age: 52
End: 2022-10-25

## 2022-10-25 VITALS
HEIGHT: 61 IN | BODY MASS INDEX: 40.02 KG/M2 | HEART RATE: 87 BPM | WEIGHT: 212 LBS | OXYGEN SATURATION: 99 % | DIASTOLIC BLOOD PRESSURE: 82 MMHG | SYSTOLIC BLOOD PRESSURE: 142 MMHG

## 2022-10-25 DIAGNOSIS — R63.5 ABNORMAL WEIGHT GAIN: ICD-10-CM

## 2022-10-25 DIAGNOSIS — N97.9 FEMALE INFERTILITY, UNSPECIFIED: ICD-10-CM

## 2022-10-25 DIAGNOSIS — M79.89 OTHER SPECIFIED SOFT TISSUE DISORDERS: ICD-10-CM

## 2022-10-25 DIAGNOSIS — Z83.49 FAMILY HISTORY OF OTHER ENDOCRINE, NUTRITIONAL AND METABOLIC DISEASES: ICD-10-CM

## 2022-10-25 PROCEDURE — 99205 OFFICE O/P NEW HI 60 MIN: CPT

## 2022-10-25 RX ORDER — TERBINAFINE HYDROCHLORIDE 250 MG/1
250 TABLET ORAL
Refills: 0 | Status: DISCONTINUED | COMMUNITY
Start: 2022-06-03 | End: 2022-10-25

## 2022-10-25 NOTE — PHYSICAL EXAM
[Alert] : alert [Well Nourished] : well nourished [No Acute Distress] : no acute distress [Well Developed] : well developed [Normal Sclera/Conjunctiva] : normal sclera/conjunctiva [EOMI] : extra ocular movement intact [No Proptosis] : no proptosis [Normal Oropharynx] : the oropharynx was normal [No Respiratory Distress] : no respiratory distress [No Accessory Muscle Use] : no accessory muscle use [Clear to Auscultation] : lungs were clear to auscultation bilaterally [Normal S1, S2] : normal S1 and S2 [Normal Rate] : heart rate was normal [Regular Rhythm] : with a regular rhythm [No Edema] : no peripheral edema [Pedal Pulses Normal] : the pedal pulses are present [Normal Bowel Sounds] : normal bowel sounds [Not Tender] : non-tender [Not Distended] : not distended [Soft] : abdomen soft [Normal Anterior Cervical Nodes] : no anterior cervical lymphadenopathy [Normal Posterior Cervical Nodes] : no posterior cervical lymphadenopathy [No Spinal Tenderness] : no spinal tenderness [Spine Straight] : spine straight [No Stigmata of Cushings Syndrome] : no stigmata of Cushings Syndrome [Normal Gait] : normal gait [Normal Strength/Tone] : muscle strength and tone were normal [No Rash] : no rash [Normal Reflexes] : deep tendon reflexes were 2+ and symmetric [No Tremors] : no tremors [Oriented x3] : oriented to person, place, and time [Acanthosis Nigricans] : no acanthosis nigricans [de-identified] : Mildly enlarged thyroid on exam [de-identified] : Has some mild rash on her upper arm and her lower extremity on the left calf sees dermatology patient believes he gets worse when she eats carbohydrates

## 2022-10-27 ENCOUNTER — RESULT REVIEW (OUTPATIENT)
Age: 52
End: 2022-10-27

## 2022-10-27 ENCOUNTER — TRANSCRIPTION ENCOUNTER (OUTPATIENT)
Age: 52
End: 2022-10-27

## 2022-10-27 ENCOUNTER — APPOINTMENT (OUTPATIENT)
Dept: PAIN MANAGEMENT | Facility: HOSPITAL | Age: 52
End: 2022-10-27

## 2022-11-12 ENCOUNTER — RESULT REVIEW (OUTPATIENT)
Age: 52
End: 2022-11-12

## 2022-11-16 ENCOUNTER — APPOINTMENT (OUTPATIENT)
Dept: OBGYN | Facility: CLINIC | Age: 52
End: 2022-11-16

## 2022-11-18 ENCOUNTER — APPOINTMENT (OUTPATIENT)
Dept: PAIN MANAGEMENT | Facility: CLINIC | Age: 52
End: 2022-11-18

## 2022-11-18 VITALS
TEMPERATURE: 98 F | BODY MASS INDEX: 40.02 KG/M2 | HEIGHT: 61 IN | SYSTOLIC BLOOD PRESSURE: 142 MMHG | DIASTOLIC BLOOD PRESSURE: 80 MMHG | WEIGHT: 212 LBS

## 2022-11-18 DIAGNOSIS — M79.18 MYALGIA, OTHER SITE: ICD-10-CM

## 2022-11-18 DIAGNOSIS — M47.817 SPONDYLOSIS W/OUT MYELOPATHY OR RADICULOPATHY, LUMBOSACRAL REGION: ICD-10-CM

## 2022-11-18 DIAGNOSIS — G89.4 CHRONIC PAIN SYNDROME: ICD-10-CM

## 2022-11-18 DIAGNOSIS — M54.16 RADICULOPATHY, LUMBAR REGION: ICD-10-CM

## 2022-11-18 PROCEDURE — 99214 OFFICE O/P EST MOD 30 MIN: CPT

## 2022-11-18 NOTE — ASSESSMENT
[FreeTextEntry1] : >> Imaging and Other Studies\par \par I personally reviewed the relevant imaging.  Discussed and explained to patient the likely source of pathology and pain.  Questions answered. MRI \par \par >> Therapy and Other Modalities\par \par home exercises\par \par >> Medications\par \par trial cymbalta 20mg nightly\par cautioned change in mood.  Encouraged to call with any worsening mood or depression/suicidal ideations\par \par acetaminophen 650mg q8h prn pain (caution <3g daily)\par  \par >> Interventions\par \par Significant component of back and leg pain likely secondary to lumbar discogenic pain demonstrated on MRI LS.  \par \par given efficacy of previous intervention that is >80% improvement in pain and improved ability to perform adls, and return of pain despite conservative treatment, sp  repeat L4-5 interlaminar epidural steroid injection \par \par given efficacy of previous intervention that is >80% improvement in pain and improved ability to perform adls, and return of pain despite conservative treatment, will schedule repeat  L4-5 interlaminar epidural steroid injection r/b/a discussed\par \par \par Significant component of axial back pain secondary to lumbar spondylosis and facet arthropathy demonstrated on MRI LS.  Pain refractory to conservative treatments.  sp BILATERAL L4-sacral ala diagnostic medial branch block (2 joints, 3 nerves on each side) with 80% improvement\par \par given efficacy of previous intervention that is >80% improvement in pain and improved ability to perform adls, and return of pain despite conservative treatment, sp repeat BILATERAL L4-sacral ala diagnostic medial branch block (2 joints, 3 nerves on each side) with 100% improvement\par \par Given significant relief from diagnostic lumbar medial branch block of >80%, and significant improvement in function (as measured by NO) and continued lumbar facet arthropathy pain (demonstrated on imaging) and axial back pain, sp LEFT then RIGHT  L4-sacral ala medial branch (2 joints, 3 nerves on each side) radiofrequency ablation \par \par \par \par May consider radiofreqency ablation\par \par \par >> Consults\par \par Palpable superficial left thigh mass. Has been present for a long time as per patient. f/u with PCP recommended. Patient to see PCP in 2 months. No need for imaging at this time given no oncologic history or focal symptoms, changes in size/symptoms, and given pain better post epidural injection which is evident pain originates from spine.\par \par >> Discussion of Risks/Benefits/Alternatives\par \par 	>Regarding any scheduled procedures:\par \par I have discussed in detail with the patient that any interventional pain procedure is associated with potential risks.  The procedure may include an injection of steroids and potentially other medications (local anesthetic and normal saline) into the epidural space or surrounding tissue of the spine.  There are significant risks of this procedure which include and are not limited to infection, bleeding, worsening pain, dural puncture leading to postdural puncture headache, nerve damage, spinal cord injury, paralysis, stroke, and death.  \par \par There is a chance that the procedure does not improve their pain.  \par \par There are risks associated with the steroid being absorbed into the body systemically.  These include dysphoria, difficulty sleeping, mood swings and personality changes.  Premenopausal women may notice an irregularity in her menstrual cycle for 2-3 months following the injection.  Steroids can specifically affect patients with hypertension, diabetes, and peptic ulcers.  The procedure may cause a temporary increase in blood pressure and blood pressure, and may adversely affect a peptic ulcer.  Other, more rare complications, include avascular necrosis of joints, glaucoma and worsening of osteoporosis. \par \par I have discussed the risks of the procedure at length with the patient, and the potential benefits of pain relief.  I have offered alternatives to the procedure.  All questions were answered.  \par \par The patient expressed understanding and wishes to proceed with the procedure.\par \par 	>Regarding COVID19 Pandemic: \par \par Any planned interventional pain procedure are scheduled because further delay may cause harm or negative outcome to patient.  The goal in performing this procedure is to avoid deterioration of function, emergency room visits (which increases exposure) and reliance on opioids.  \par \par r/b/a discussed with patient, lack of evidence to conclusively determine whether pain management procedures have any positive or negative impact on the possibility of samaria the virus and/or development of any sequelae. \par \par Patient counselled regarding timing steroid based intervention 2 weeks before or after COVID-19 vaccine administration to avoid any interaction or affect on efficacy of vaccination\par \par Patient demonstrates understanding\par \par Informed patient that risks associated with the COVID-19 infection.  Informed patient steps taken to limit the risks.  We are implementing safety precautions and following protocols consistent with the CDC and state recommendations. All patients and staff will be checked for fever or signs of illness upon entry to the facility. We will limit our steroid dose to the lowest effective therapeutic dose or in some cases steroids will not be injected at all. \par \par Patient agrees to proceed\par \par >> Conclusion\par \par The above diagnosis and treatment plan is medically reasonable and necessary based on the patient encounter \par There were no barriers to communication.\par Informed patient that I would be available for any additional questions.\par Patient was instructed to call with any worsening symptoms including severe pain, new numbness/weakness, or changes in the bowel/bladder function. \par Discussed role of nsaids in pain management and all relevant risks, if patient is continuing to require after 4 weeks the patient should f/u for alternative treatment. \par Instructed patient to maintain pain diary to monitor pain level, mobility, and function.\par \par \par

## 2022-11-18 NOTE — HISTORY OF PRESENT ILLNESS
[7] : 3. What number best describes how, during the past week, pain has interfered with your general activity? 7/10 pain [FreeTextEntry1] : Interval Note:\par \par LEFT then RIGHT  L4-sacral ala medial branch (2 joints, 3 nerves on each side) radiofrequency ablation 10/27/22\par with improvement in axial back pain but continues to have persistent left buttock lateral thigh pain worse with movement, at night, and when doing housework.  Pain is so bad that patient finds it difficult to perform adls and ambulate.   Denies any additional weakness, numbness, bowel/bladder dysfunction.  \par \par \par HPI\par \par Ms. TRE YOUNG is a 52 year F with pmhx of sp bilateral partial knee replacement presents with left back and anterolateral thigh pain for 6 months after twisting.  Pain is so bad that patient finds it difficult to perform adls and ambulate. denies any worsening numbness, weakness, bowel/bladder dysfunction. \par \par \par Previous and current pain medications/doses/effects:\par \par tylenol without improvement\par \par Previous Pain Treatments:\par \par PT without improvement\par \par Previous Pain Injections:\par \par \par LEFT then RIGHT  L4-sacral ala medial branch (2 joints, 3 nerves on each side) radiofrequency ablation \par   L4-5 interlaminar epidural steroid injection 7/22/22\par L4-5 interlaminar epidural steroid injection 2/10/22\par Facet nerve block\par SIJ injection\par \par Previous Diagnostic Studies/Images:\par \par MRI LS 10/21\par \par  The paraspinal musculature including the psoas muscle, multifidus muscles, and \par  immediate para axial soft tissues appear within range of normal without \par  evidence of mass or collection. \par \par  There is a maintenance of the normal lumbar lordosis. There is fairly uniform \par  marrow signal on all sequences. \par \par  The spinal canal is patent without detectable intradural or extradural mass or \par  collection. \par \par  At L5-S1 \par  There is disc desiccation and bulging of the outer annular fibers \par  asymmetrically prominent to the left mildly effacing the left lateral recess \par  and with a shallow left foraminal bulge. \par \par  At L4-L5 \par  There is disc desiccation and circumferential disc bulging asymmetrically \par  pronounced to the left. There is a small left foraminal disc protrusion best \par  seen on axial image 14 series 7 and sagittal image 7 series 6 with a tiny \par  cranially extruded fragment. \par \par  Bony productive changes associated with facet arthropathy are present at this \par  level. \par \par  At L3-L4 \par  There is disc desiccation but no focal disc protrusion. \par \par  At L2-L3 \par  The disc appears intact. There is no significant central or foraminal \par  stenosis. \par \par  At L1-L2 \par  The disc appears intact. There is no significant central or foraminal \par  stenosis. \par \par  At T12-L1 there is disc desiccation loss of intervertebral disc space height. \par  A shallow right foraminal disc protrusion is present. \par \par  IMPRESSION: \par \par  Shallow right foraminal disc protrusion at T12-L1 \par \par  Small left foraminal disc protrusion with tiny cranially extruded disc \par  fragment L4-L5 \par \par  Circumferential bulging of the L5-S1 disc asymmetrically prominent to the left \par  includes a shallow left foraminal disc bulge \par \par \par  [FreeTextEntry2] : 21

## 2022-12-02 ENCOUNTER — APPOINTMENT (OUTPATIENT)
Dept: ENDOCRINOLOGY | Facility: CLINIC | Age: 52
End: 2022-12-02
Payer: COMMERCIAL

## 2022-12-02 VITALS
BODY MASS INDEX: 40.02 KG/M2 | HEIGHT: 61 IN | HEART RATE: 83 BPM | WEIGHT: 212 LBS | DIASTOLIC BLOOD PRESSURE: 82 MMHG | OXYGEN SATURATION: 99 % | SYSTOLIC BLOOD PRESSURE: 130 MMHG

## 2022-12-02 LAB
ACTH SER-ACNC: 15 PG/ML
ALBUMIN SERPL ELPH-MCNC: 4.3 G/DL
ALP BLD-CCNC: 62 U/L
ALT SERPL-CCNC: 7 U/L
ANION GAP SERPL CALC-SCNC: 11 MMOL/L
AST SERPL-CCNC: 12 U/L
BILIRUB SERPL-MCNC: 0.4 MG/DL
BUN SERPL-MCNC: 12 MG/DL
CALCIUM SERPL-MCNC: 9.7 MG/DL
CHLORIDE SERPL-SCNC: 107 MMOL/L
CHOLEST SERPL-MCNC: 215 MG/DL
CO2 SERPL-SCNC: 27 MMOL/L
CORTIS SERPL-MCNC: 9.1 UG/DL
CREAT SERPL-MCNC: 0.77 MG/DL
EGFR: 92 ML/MIN/1.73M2
ESTIMATED AVERAGE GLUCOSE: 100 MG/DL
ESTRADIOL SERPL-MCNC: 12 PG/ML
FSH SERPL-MCNC: 66.5 IU/L
GH SERPL-MCNC: 1.2 NG/ML
GLUCOSE SERPL-MCNC: 91 MG/DL
HBA1C MFR BLD HPLC: 5.1 %
HDLC SERPL-MCNC: 57 MG/DL
IGF-1 INTERP: NORMAL
IGF-I BLD-MCNC: 125 NG/ML
LDLC SERPL CALC-MCNC: 133 MG/DL
LH SERPL-ACNC: 32.8 IU/L
NONHDLC SERPL-MCNC: 157 MG/DL
POTASSIUM SERPL-SCNC: 4.4 MMOL/L
PROLACTIN SERPL-MCNC: 5.9 NG/ML
PROT SERPL-MCNC: 7.4 G/DL
SODIUM SERPL-SCNC: 145 MMOL/L
T4 FREE SERPL-MCNC: 1.2 NG/DL
THYROGLOB AB SERPL-ACNC: <20 IU/ML
THYROPEROXIDASE AB SERPL IA-ACNC: 34 IU/ML
TRIGL SERPL-MCNC: 124 MG/DL
TSH SERPL-ACNC: 2.73 UIU/ML

## 2022-12-02 PROCEDURE — G0447 BEHAVIOR COUNSEL OBESITY 15M: CPT | Mod: 59

## 2022-12-02 PROCEDURE — 99215 OFFICE O/P EST HI 40 MIN: CPT | Mod: 25

## 2022-12-02 NOTE — HISTORY OF PRESENT ILLNESS
[FreeTextEntry1] : Ms. TRE YOUNG is a 52 year old female\par Coming for follow-up obesity status post gastric sleeve not able to lose weight also hormone checked as has family history of thyroid disease\par 2018 gastric sleeve lost weight 100 started to gain back 30\par \par sister with hashimoto thyroiditis\par on thyroid supplements

## 2022-12-02 NOTE — PHYSICAL EXAM
[Alert] : alert [Well Nourished] : well nourished [No Acute Distress] : no acute distress [Well Developed] : well developed [Normal Sclera/Conjunctiva] : normal sclera/conjunctiva [EOMI] : extra ocular movement intact [No Proptosis] : no proptosis [Normal Oropharynx] : the oropharynx was normal [No Respiratory Distress] : no respiratory distress [No Accessory Muscle Use] : no accessory muscle use [Clear to Auscultation] : lungs were clear to auscultation bilaterally [Normal S1, S2] : normal S1 and S2 [Normal Rate] : heart rate was normal [Regular Rhythm] : with a regular rhythm [No Edema] : no peripheral edema [Pedal Pulses Normal] : the pedal pulses are present [Normal Bowel Sounds] : normal bowel sounds [Not Tender] : non-tender [Not Distended] : not distended [Soft] : abdomen soft [Normal Anterior Cervical Nodes] : no anterior cervical lymphadenopathy [No Spinal Tenderness] : no spinal tenderness [Spine Straight] : spine straight [No Stigmata of Cushings Syndrome] : no stigmata of Cushings Syndrome [Normal Gait] : normal gait [Normal Strength/Tone] : muscle strength and tone were normal [No Rash] : no rash [Normal Reflexes] : deep tendon reflexes were 2+ and symmetric [No Tremors] : no tremors [Oriented x3] : oriented to person, place, and time [Acanthosis Nigricans] : no acanthosis nigricans [de-identified] : Mildly enlarged thyroid on exam [de-identified] : Has some mild rash on her upper arm and her lower extremity on the left calf sees dermatology patient believes he gets worse when she eats carbohydrates

## 2022-12-08 ENCOUNTER — APPOINTMENT (OUTPATIENT)
Dept: PAIN MANAGEMENT | Facility: CLINIC | Age: 52
End: 2022-12-08

## 2022-12-13 RX ORDER — PHENTERMINE AND TOPIRAMATE 3.75; 23 MG/1; MG/1
3.75-23 CAPSULE, EXTENDED RELEASE ORAL DAILY
Qty: 15 | Refills: 0 | Status: DISCONTINUED | COMMUNITY
Start: 2022-12-02 | End: 2022-12-13

## 2022-12-13 RX ORDER — PHENTERMINE AND TOPIRAMATE 7.5; 46 MG/1; MG/1
7.5-46 CAPSULE, EXTENDED RELEASE ORAL DAILY
Qty: 30 | Refills: 2 | Status: DISCONTINUED | COMMUNITY
Start: 2022-12-02 | End: 2022-12-13

## 2023-01-03 ENCOUNTER — APPOINTMENT (OUTPATIENT)
Dept: RHEUMATOLOGY | Facility: CLINIC | Age: 53
End: 2023-01-03
Payer: COMMERCIAL

## 2023-01-03 VITALS
HEIGHT: 61 IN | SYSTOLIC BLOOD PRESSURE: 110 MMHG | WEIGHT: 210 LBS | DIASTOLIC BLOOD PRESSURE: 64 MMHG | BODY MASS INDEX: 39.65 KG/M2 | OXYGEN SATURATION: 98 % | HEART RATE: 80 BPM

## 2023-01-03 PROCEDURE — 99205 OFFICE O/P NEW HI 60 MIN: CPT

## 2023-01-03 RX ORDER — METHYLPREDNISOLONE 4 MG/1
4 TABLET ORAL
Qty: 1 | Refills: 0 | Status: COMPLETED | COMMUNITY
Start: 2023-01-03 | End: 2023-01-10

## 2023-01-03 NOTE — PHYSICAL EXAM
[General Appearance - Alert] : alert [General Appearance - In No Acute Distress] : in no acute distress [General Appearance - Well Nourished] : well nourished [General Appearance - Well Developed] : well developed [Sclera] : the sclera and conjunctiva were normal [Auscultation Breath Sounds / Voice Sounds] : lungs were clear to auscultation bilaterally [Cervical Lymph Nodes Enlarged Posterior Bilaterally] : posterior cervical [Cervical Lymph Nodes Enlarged Anterior Bilaterally] : anterior cervical [] : no rash [Motor Exam] : the motor exam was normal [FreeTextEntry1] : There is right elbow joint line tenderness without palpable swelling. There is right knee joint line tenderness medially. Threre is bilateral ankle joint line tenderness, left >> right. There is left plantar tenderness. There is scattered MTP tenderness. There is left Achilles enthesis tenderness without swelling. There is pain on stressing the left SI joint during provocative testing. There are FM tender points with normal control points.

## 2023-01-03 NOTE — HISTORY OF PRESENT ILLNESS
[FreeTextEntry1] : Patient started having left buttock pain in 2021 which is uncomfortable during the night and causes AM stiffness > 30 minutes every morning. Patient sometimes radiates into the lateral aspect of the left hip and into the anterior aspect of the left thigh at times but does not radiate down the leg further. Patient has undergone two  steroid injections - one VIOLET and one nerve block - neither of which were effective per patient. There is also some hand, ankle and elbow pain, but not with much AM stiffness or perceivable swelling. There has been no rash, and there are no , GI or ocular symptoms. Has a sister who is treated for inflammatory oligoarthropathy (treated with leflunomide). Was started on duloxetine 20 mg daily in November, but did not go back to have the dose increased. Also developing nightly muscle cramps in the legs.

## 2023-01-17 ENCOUNTER — NON-APPOINTMENT (OUTPATIENT)
Age: 53
End: 2023-01-17

## 2023-01-17 ENCOUNTER — APPOINTMENT (OUTPATIENT)
Dept: OBGYN | Facility: CLINIC | Age: 53
End: 2023-01-17
Payer: COMMERCIAL

## 2023-01-17 VITALS
HEIGHT: 61 IN | SYSTOLIC BLOOD PRESSURE: 116 MMHG | WEIGHT: 206 LBS | DIASTOLIC BLOOD PRESSURE: 70 MMHG | BODY MASS INDEX: 38.89 KG/M2

## 2023-01-17 PROCEDURE — 99396 PREV VISIT EST AGE 40-64: CPT

## 2023-01-17 NOTE — HISTORY OF PRESENT ILLNESS
[TextBox_4] : 51yo P2 here for annual. No periods since 2019. H/o ablation for abnormal bleeding in 2/2016. She had a sleeve gastrectomy 2018 with Justin and has lost 80 lbs. \par Has had some back pain lately and less able to work out.\par C/o decreased libido.\par  ; 2 children- in their 20s.  Both have left home. Pt works at a pediatric office; [employer] in [facility]/ now part of [facility].. \par

## 2023-01-30 LAB
CYTOLOGY CVX/VAG DOC THIN PREP: ABNORMAL
HPV HIGH+LOW RISK DNA PNL CVX: NOT DETECTED

## 2023-02-03 ENCOUNTER — APPOINTMENT (OUTPATIENT)
Dept: RHEUMATOLOGY | Facility: CLINIC | Age: 53
End: 2023-02-03
Payer: COMMERCIAL

## 2023-02-03 VITALS
DIASTOLIC BLOOD PRESSURE: 80 MMHG | WEIGHT: 205.4 LBS | HEIGHT: 61 IN | HEART RATE: 78 BPM | BODY MASS INDEX: 38.78 KG/M2 | SYSTOLIC BLOOD PRESSURE: 118 MMHG | OXYGEN SATURATION: 98 %

## 2023-02-03 PROCEDURE — 99214 OFFICE O/P EST MOD 30 MIN: CPT

## 2023-02-03 NOTE — HISTORY OF PRESENT ILLNESS
[FreeTextEntry1] : Patient reports that she sustained complete resolution of back and peripheral joint symptoms while on the Medrol Dose Pack, then after finishing the Dose Pack symptoms recurred and are back to baseline (i.e. VAS went from 10/10 to 0/10, then back to 10/10).

## 2023-02-03 NOTE — PHYSICAL EXAM
[General Appearance - Alert] : alert [General Appearance - In No Acute Distress] : in no acute distress [General Appearance - Well Nourished] : well nourished [General Appearance - Well Developed] : well developed [Sclera] : the sclera and conjunctiva were normal [] : no rash [Motor Exam] : the motor exam was normal [FreeTextEntry1] : There is right elbow joint line tenderness without palpable swelling. There is right knee joint line tenderness medially. Threre is bilateral ankle joint line tenderness, left >> right. There is left plantar tenderness. There is scattered MTP tenderness. There is left Achilles enthesis tenderness without swelling. There is pain on stressing the left SI joint during provocative testing. There are FM tender points with normal control points.

## 2023-02-04 LAB
HBV SURFACE AG SER QL: NONREACTIVE
HCV AB SER QL: NONREACTIVE
HCV S/CO RATIO: 0.07 S/CO

## 2023-02-08 ENCOUNTER — TRANSCRIPTION ENCOUNTER (OUTPATIENT)
Age: 53
End: 2023-02-08

## 2023-02-08 LAB
M TB IFN-G BLD-IMP: NEGATIVE
QUANTIFERON TB PLUS MITOGEN MINUS NIL: 7.97 IU/ML
QUANTIFERON TB PLUS NIL: 0.03 IU/ML
QUANTIFERON TB PLUS TB1 MINUS NIL: -0.01 IU/ML
QUANTIFERON TB PLUS TB2 MINUS NIL: 0 IU/ML

## 2023-03-01 ENCOUNTER — APPOINTMENT (OUTPATIENT)
Dept: RHEUMATOLOGY | Facility: CLINIC | Age: 53
End: 2023-03-01
Payer: COMMERCIAL

## 2023-03-01 PROCEDURE — 36415 COLL VENOUS BLD VENIPUNCTURE: CPT

## 2023-03-02 LAB
ALBUMIN SERPL ELPH-MCNC: 4.5 G/DL
ALP BLD-CCNC: 62 U/L
ALT SERPL-CCNC: 8 U/L
ANION GAP SERPL CALC-SCNC: 11 MMOL/L
AST SERPL-CCNC: 10 U/L
BASOPHILS # BLD AUTO: 0.07 K/UL
BASOPHILS NFR BLD AUTO: 0.7 %
BILIRUB SERPL-MCNC: 0.2 MG/DL
BUN SERPL-MCNC: 11 MG/DL
CALCIUM SERPL-MCNC: 9.7 MG/DL
CHLORIDE SERPL-SCNC: 108 MMOL/L
CO2 SERPL-SCNC: 27 MMOL/L
CREAT SERPL-MCNC: 0.79 MG/DL
EGFR: 89 ML/MIN/1.73M2
EOSINOPHIL # BLD AUTO: 0.71 K/UL
EOSINOPHIL NFR BLD AUTO: 7.6 %
GLUCOSE SERPL-MCNC: 79 MG/DL
HCT VFR BLD CALC: 41.5 %
HGB BLD-MCNC: 13.6 G/DL
IMM GRANULOCYTES NFR BLD AUTO: 0.2 %
LYMPHOCYTES # BLD AUTO: 2.49 K/UL
LYMPHOCYTES NFR BLD AUTO: 26.5 %
MAN DIFF?: NORMAL
MCHC RBC-ENTMCNC: 29.6 PG
MCHC RBC-ENTMCNC: 32.8 GM/DL
MCV RBC AUTO: 90.2 FL
MONOCYTES # BLD AUTO: 0.64 K/UL
MONOCYTES NFR BLD AUTO: 6.8 %
NEUTROPHILS # BLD AUTO: 5.46 K/UL
NEUTROPHILS NFR BLD AUTO: 58.2 %
PLATELET # BLD AUTO: 256 K/UL
POTASSIUM SERPL-SCNC: 4.9 MMOL/L
PROT SERPL-MCNC: 7.1 G/DL
RBC # BLD: 4.6 M/UL
RBC # FLD: 13.2 %
SODIUM SERPL-SCNC: 146 MMOL/L
WBC # FLD AUTO: 9.39 K/UL

## 2023-04-12 ENCOUNTER — APPOINTMENT (OUTPATIENT)
Dept: RHEUMATOLOGY | Facility: CLINIC | Age: 53
End: 2023-04-12
Payer: COMMERCIAL

## 2023-04-12 PROCEDURE — 36415 COLL VENOUS BLD VENIPUNCTURE: CPT

## 2023-04-14 LAB
ALBUMIN SERPL ELPH-MCNC: 4.5 G/DL
ALP BLD-CCNC: 63 U/L
ALT SERPL-CCNC: 15 U/L
ANION GAP SERPL CALC-SCNC: 10 MMOL/L
AST SERPL-CCNC: 13 U/L
BASOPHILS # BLD AUTO: 0.06 K/UL
BASOPHILS NFR BLD AUTO: 0.9 %
BILIRUB SERPL-MCNC: 0.3 MG/DL
BUN SERPL-MCNC: 14 MG/DL
CALCIUM SERPL-MCNC: 9.8 MG/DL
CHLORIDE SERPL-SCNC: 107 MMOL/L
CO2 SERPL-SCNC: 27 MMOL/L
CREAT SERPL-MCNC: 0.78 MG/DL
EGFR: 91 ML/MIN/1.73M2
EOSINOPHIL # BLD AUTO: 0.31 K/UL
EOSINOPHIL NFR BLD AUTO: 4.7 %
GLUCOSE SERPL-MCNC: 81 MG/DL
HCT VFR BLD CALC: 41.7 %
HGB BLD-MCNC: 12.9 G/DL
IMM GRANULOCYTES NFR BLD AUTO: 0.2 %
LYMPHOCYTES # BLD AUTO: 2.56 K/UL
LYMPHOCYTES NFR BLD AUTO: 39.1 %
MAN DIFF?: NORMAL
MCHC RBC-ENTMCNC: 29.1 PG
MCHC RBC-ENTMCNC: 30.9 GM/DL
MCV RBC AUTO: 94.1 FL
MONOCYTES # BLD AUTO: 0.47 K/UL
MONOCYTES NFR BLD AUTO: 7.2 %
NEUTROPHILS # BLD AUTO: 3.13 K/UL
NEUTROPHILS NFR BLD AUTO: 47.9 %
PLATELET # BLD AUTO: 197 K/UL
POTASSIUM SERPL-SCNC: 4.5 MMOL/L
PROT SERPL-MCNC: 6.9 G/DL
RBC # BLD: 4.43 M/UL
RBC # FLD: 13.3 %
SODIUM SERPL-SCNC: 144 MMOL/L
WBC # FLD AUTO: 6.54 K/UL

## 2023-05-05 ENCOUNTER — APPOINTMENT (OUTPATIENT)
Dept: RHEUMATOLOGY | Facility: CLINIC | Age: 53
End: 2023-05-05
Payer: COMMERCIAL

## 2023-05-05 VITALS
HEIGHT: 61 IN | BODY MASS INDEX: 35.57 KG/M2 | WEIGHT: 188.38 LBS | HEART RATE: 97 BPM | SYSTOLIC BLOOD PRESSURE: 120 MMHG | DIASTOLIC BLOOD PRESSURE: 80 MMHG | OXYGEN SATURATION: 97 %

## 2023-05-05 DIAGNOSIS — Z79.899 OTHER LONG TERM (CURRENT) DRUG THERAPY: ICD-10-CM

## 2023-05-05 PROCEDURE — 99214 OFFICE O/P EST MOD 30 MIN: CPT | Mod: 25

## 2023-05-05 PROCEDURE — 20610 DRAIN/INJ JOINT/BURSA W/O US: CPT | Mod: LT

## 2023-05-05 RX ORDER — TRIAMCINOLONE ACETONIDE 40 MG/ML
40 SUSPENSION INTRA-ARTERIAL; INTRAMUSCULAR
Qty: 1 | Refills: 0 | Status: COMPLETED
Start: 2023-05-05 | End: 2023-05-06

## 2023-05-05 RX ORDER — TRIAMCINOLONE ACETONIDE 40 MG/ML
40 SUSPENSION INTRA-ARTERIAL; INTRAMUSCULAR
Qty: 1 | Refills: 0 | Status: COMPLETED | OUTPATIENT
Start: 2023-05-05 | End: 2023-05-06

## 2023-05-05 RX ADMIN — TRIAMCINOLONE ACETONIDE 0 MG/ML: 40 SUSPENSION INTRA-ARTERIAL; INTRAMUSCULAR at 00:00

## 2023-05-05 NOTE — HISTORY OF PRESENT ILLNESS
[FreeTextEntry1] : Patient notes significant improvement in joint symptoms - reports about 65% improvement in symptoms. Still having residual ankle pain and stiffness.

## 2023-05-05 NOTE — PROCEDURE
[FreeTextEntry1] : Left ankle prepped with betadine; 1% lido applied to subQ tissue; 2cc 1% lido and 2cc Kenalog 40 injected into left ankle with good results and no complications.

## 2023-05-05 NOTE — PHYSICAL EXAM
[General Appearance - Alert] : alert [General Appearance - In No Acute Distress] : in no acute distress [General Appearance - Well Nourished] : well nourished [General Appearance - Well Developed] : well developed [Sclera] : the sclera and conjunctiva were normal [] : no rash [Motor Exam] : the motor exam was normal [FreeTextEntry1] : There is minimal joint tenderness of the right elbow. There is joint line tenderness of the left ankle, with minimal, if any, tenderness of the right ankle.

## 2023-05-08 LAB
ALBUMIN SERPL ELPH-MCNC: 4.3 G/DL
ALP BLD-CCNC: 67 U/L
ALT SERPL-CCNC: 9 U/L
ANION GAP SERPL CALC-SCNC: 12 MMOL/L
AST SERPL-CCNC: 11 U/L
BASOPHILS # BLD AUTO: 0.05 K/UL
BASOPHILS NFR BLD AUTO: 1 %
BILIRUB SERPL-MCNC: 0.5 MG/DL
BUN SERPL-MCNC: 10 MG/DL
CALCIUM SERPL-MCNC: 9.7 MG/DL
CHLORIDE SERPL-SCNC: 106 MMOL/L
CO2 SERPL-SCNC: 26 MMOL/L
CREAT SERPL-MCNC: 0.74 MG/DL
EGFR: 96 ML/MIN/1.73M2
EOSINOPHIL # BLD AUTO: 0.3 K/UL
EOSINOPHIL NFR BLD AUTO: 5.7 %
GLUCOSE SERPL-MCNC: 80 MG/DL
HCT VFR BLD CALC: 41.9 %
HGB BLD-MCNC: 13.1 G/DL
IMM GRANULOCYTES NFR BLD AUTO: 0.2 %
LYMPHOCYTES # BLD AUTO: 1.6 K/UL
LYMPHOCYTES NFR BLD AUTO: 30.6 %
MAN DIFF?: NORMAL
MCHC RBC-ENTMCNC: 29 PG
MCHC RBC-ENTMCNC: 31.3 GM/DL
MCV RBC AUTO: 92.7 FL
MONOCYTES # BLD AUTO: 0.3 K/UL
MONOCYTES NFR BLD AUTO: 5.7 %
NEUTROPHILS # BLD AUTO: 2.97 K/UL
NEUTROPHILS NFR BLD AUTO: 56.8 %
PLATELET # BLD AUTO: 271 K/UL
POTASSIUM SERPL-SCNC: 4.3 MMOL/L
PROT SERPL-MCNC: 7.3 G/DL
RBC # BLD: 4.52 M/UL
RBC # FLD: 13.2 %
SODIUM SERPL-SCNC: 144 MMOL/L
WBC # FLD AUTO: 5.23 K/UL

## 2023-05-15 LAB
ALBUMIN SERPL ELPH-MCNC: 4.3 G/DL
ALP BLD-CCNC: 63 U/L
ALT SERPL-CCNC: 7 U/L
ANION GAP SERPL CALC-SCNC: 13 MMOL/L
AST SERPL-CCNC: 9 U/L
BILIRUB SERPL-MCNC: 0.7 MG/DL
BUN SERPL-MCNC: 13 MG/DL
CALCIUM SERPL-MCNC: 9.7 MG/DL
CHLORIDE SERPL-SCNC: 107 MMOL/L
CHOLEST SERPL-MCNC: 178 MG/DL
CO2 SERPL-SCNC: 26 MMOL/L
CREAT SERPL-MCNC: 0.8 MG/DL
CREAT SPEC-SCNC: 299 MG/DL
EGFR: 88 ML/MIN/1.73M2
ESTIMATED AVERAGE GLUCOSE: 94 MG/DL
GLUCOSE SERPL-MCNC: 87 MG/DL
HBA1C MFR BLD HPLC: 4.9 %
HDLC SERPL-MCNC: 54 MG/DL
LDLC SERPL CALC-MCNC: 109 MG/DL
MICROALBUMIN 24H UR DL<=1MG/L-MCNC: 1.9 MG/DL
MICROALBUMIN/CREAT 24H UR-RTO: 6 MG/G
NONHDLC SERPL-MCNC: 124 MG/DL
POTASSIUM SERPL-SCNC: 4.1 MMOL/L
PROT SERPL-MCNC: 6.8 G/DL
SODIUM SERPL-SCNC: 146 MMOL/L
TRIGL SERPL-MCNC: 78 MG/DL

## 2023-05-19 ENCOUNTER — APPOINTMENT (OUTPATIENT)
Dept: ENDOCRINOLOGY | Facility: CLINIC | Age: 53
End: 2023-05-19
Payer: COMMERCIAL

## 2023-05-19 VITALS
OXYGEN SATURATION: 98 % | WEIGHT: 184 LBS | BODY MASS INDEX: 34.74 KG/M2 | SYSTOLIC BLOOD PRESSURE: 130 MMHG | HEART RATE: 96 BPM | HEIGHT: 61 IN | DIASTOLIC BLOOD PRESSURE: 82 MMHG

## 2023-05-19 DIAGNOSIS — E66.01 MORBID (SEVERE) OBESITY DUE TO EXCESS CALORIES: ICD-10-CM

## 2023-05-19 PROCEDURE — G0447 BEHAVIOR COUNSEL OBESITY 15M: CPT | Mod: 59

## 2023-05-19 PROCEDURE — 99215 OFFICE O/P EST HI 40 MIN: CPT | Mod: 25

## 2023-05-21 PROBLEM — E66.01 MORBID OBESITY DUE TO EXCESS CALORIES: Status: ACTIVE | Noted: 2019-01-24

## 2023-05-21 NOTE — REVIEW OF SYSTEMS
[Recent Weight Loss (___ Lbs)] : recent weight loss: [unfilled] lbs [Leg Claudication] : leg claudication [Lower Ext Edema] : lower extremity edema [SOB on Exertion] : shortness of breath on exertion [FreeTextEntry6] : Has asthma follows with Dr. Manjarrez has shortness of breath with exertion and only

## 2023-05-21 NOTE — HISTORY OF PRESENT ILLNESS
[FreeTextEntry1] : Ms. TRE YOUNG is a 53 year old female\par Coming for follow-up obesity status post gastric sleeve not able to lose weight also hormone checked as has family history of thyroid disease\par 2018 gastric sleeve lost weight 100 started to gain back 30\par started Wegovy 12/2022 now at 2.4mg q week  tolerating well lost 20lb on it \par \par sister with hashimoto thyroiditis\par on thyroid supplements

## 2023-05-21 NOTE — PHYSICAL EXAM
[Alert] : alert [Well Nourished] : well nourished [No Acute Distress] : no acute distress [Well Developed] : well developed [Normal Sclera/Conjunctiva] : normal sclera/conjunctiva [EOMI] : extra ocular movement intact [No Proptosis] : no proptosis [Normal Oropharynx] : the oropharynx was normal [No Respiratory Distress] : no respiratory distress [No Accessory Muscle Use] : no accessory muscle use [Clear to Auscultation] : lungs were clear to auscultation bilaterally [Normal S1, S2] : normal S1 and S2 [Normal Rate] : heart rate was normal [Regular Rhythm] : with a regular rhythm [No Edema] : no peripheral edema [Pedal Pulses Normal] : the pedal pulses are present [Normal Bowel Sounds] : normal bowel sounds [Not Tender] : non-tender [Not Distended] : not distended [Soft] : abdomen soft [Normal Anterior Cervical Nodes] : no anterior cervical lymphadenopathy [No Spinal Tenderness] : no spinal tenderness [Spine Straight] : spine straight [No Stigmata of Cushings Syndrome] : no stigmata of Cushings Syndrome [Normal Gait] : normal gait [Normal Strength/Tone] : muscle strength and tone were normal [No Rash] : no rash [Normal Reflexes] : deep tendon reflexes were 2+ and symmetric [No Tremors] : no tremors [Oriented x3] : oriented to person, place, and time [Acanthosis Nigricans] : no acanthosis nigricans [de-identified] : Mildly enlarged thyroid on exam [de-identified] : Has some mild rash on her upper arm and her lower extremity on the left calf sees dermatology patient believes he gets worse when she eats carbohydrates

## 2023-05-30 ENCOUNTER — RESULT REVIEW (OUTPATIENT)
Age: 53
End: 2023-05-30

## 2023-06-08 ENCOUNTER — RESULT REVIEW (OUTPATIENT)
Age: 53
End: 2023-06-08

## 2023-08-11 ENCOUNTER — APPOINTMENT (OUTPATIENT)
Dept: RHEUMATOLOGY | Facility: CLINIC | Age: 53
End: 2023-08-11
Payer: COMMERCIAL

## 2023-08-11 VITALS
DIASTOLIC BLOOD PRESSURE: 78 MMHG | BODY MASS INDEX: 31.91 KG/M2 | OXYGEN SATURATION: 98 % | HEART RATE: 93 BPM | HEIGHT: 61 IN | WEIGHT: 169 LBS | SYSTOLIC BLOOD PRESSURE: 122 MMHG

## 2023-08-11 DIAGNOSIS — R25.2 CRAMP AND SPASM: ICD-10-CM

## 2023-08-11 PROCEDURE — 99214 OFFICE O/P EST MOD 30 MIN: CPT

## 2023-08-11 NOTE — PHYSICAL EXAM
[General Appearance - Alert] : alert [General Appearance - In No Acute Distress] : in no acute distress [General Appearance - Well Nourished] : well nourished [General Appearance - Well Developed] : well developed [Sclera] : the sclera and conjunctiva were normal [] : no rash [Motor Exam] : the motor exam was normal [FreeTextEntry1] : There is no evidence of active synovitis throughout all joints examined.

## 2023-08-11 NOTE — HISTORY OF PRESENT ILLNESS
[FreeTextEntry1] : Reports steroid injection in the ankle done at last visit was very effective - no recurrence of any joint pain or stiffness since that injection. Taking leflunomide as Rx'ed.

## 2023-08-14 ENCOUNTER — TRANSCRIPTION ENCOUNTER (OUTPATIENT)
Age: 53
End: 2023-08-14

## 2023-08-14 LAB
ALBUMIN SERPL ELPH-MCNC: 4.4 G/DL
ALP BLD-CCNC: 58 U/L
ALT SERPL-CCNC: 11 U/L
ANION GAP SERPL CALC-SCNC: 12 MMOL/L
AST SERPL-CCNC: 10 U/L
BILIRUB SERPL-MCNC: 0.4 MG/DL
BUN SERPL-MCNC: 10 MG/DL
CALCIUM SERPL-MCNC: 9.4 MG/DL
CHLORIDE SERPL-SCNC: 108 MMOL/L
CO2 SERPL-SCNC: 25 MMOL/L
CREAT SERPL-MCNC: 0.75 MG/DL
EGFR: 95 ML/MIN/1.73M2
GLUCOSE SERPL-MCNC: 88 MG/DL
POTASSIUM SERPL-SCNC: 4.4 MMOL/L
PROT SERPL-MCNC: 6.7 G/DL
SODIUM SERPL-SCNC: 145 MMOL/L

## 2023-08-14 RX ORDER — VALACYCLOVIR 500 MG/1
500 TABLET, FILM COATED ORAL
Qty: 90 | Refills: 2 | Status: ACTIVE | COMMUNITY
Start: 1900-01-01 | End: 1900-01-01

## 2023-09-08 ENCOUNTER — APPOINTMENT (OUTPATIENT)
Dept: ENDOCRINOLOGY | Facility: CLINIC | Age: 53
End: 2023-09-08
Payer: COMMERCIAL

## 2023-09-08 VITALS
SYSTOLIC BLOOD PRESSURE: 114 MMHG | DIASTOLIC BLOOD PRESSURE: 80 MMHG | OXYGEN SATURATION: 95 % | BODY MASS INDEX: 31.15 KG/M2 | HEART RATE: 93 BPM | WEIGHT: 165 LBS | HEIGHT: 61 IN

## 2023-09-08 DIAGNOSIS — Z80.0 FAMILY HISTORY OF MALIGNANT NEOPLASM OF DIGESTIVE ORGANS: ICD-10-CM

## 2023-09-08 DIAGNOSIS — G47.30 SLEEP APNEA, UNSPECIFIED: ICD-10-CM

## 2023-09-08 DIAGNOSIS — Z80.42 FAMILY HISTORY OF MALIGNANT NEOPLASM OF PROSTATE: ICD-10-CM

## 2023-09-08 LAB
25(OH)D3 SERPL-MCNC: 50.4 NG/ML
ALBUMIN SERPL ELPH-MCNC: 4.2 G/DL
ALP BLD-CCNC: 53 U/L
ALT SERPL-CCNC: 8 U/L
ANION GAP SERPL CALC-SCNC: 11 MMOL/L
AST SERPL-CCNC: 10 U/L
BILIRUB SERPL-MCNC: 0.4 MG/DL
BUN SERPL-MCNC: 10 MG/DL
CALCIUM SERPL-MCNC: 9.3 MG/DL
CHLORIDE SERPL-SCNC: 108 MMOL/L
CHOLEST SERPL-MCNC: 182 MG/DL
CO2 SERPL-SCNC: 26 MMOL/L
CREAT SERPL-MCNC: 0.76 MG/DL
EGFR: 93 ML/MIN/1.73M2
ESTIMATED AVERAGE GLUCOSE: 88 MG/DL
FERRITIN SERPL-MCNC: 225 NG/ML
FOLATE SERPL-MCNC: 4.4 NG/ML
GLUCOSE SERPL-MCNC: 85 MG/DL
HBA1C MFR BLD HPLC: 4.7 %
HCT VFR BLD CALC: 38.9 %
HDLC SERPL-MCNC: 49 MG/DL
HGB BLD-MCNC: 12.9 G/DL
LDLC SERPL CALC-MCNC: 110 MG/DL
MCHC RBC-ENTMCNC: 30 PG
MCHC RBC-ENTMCNC: 33.2 GM/DL
MCV RBC AUTO: 90.5 FL
NONHDLC SERPL-MCNC: 133 MG/DL
PLATELET # BLD AUTO: 225 K/UL
POTASSIUM SERPL-SCNC: 4.1 MMOL/L
PROT SERPL-MCNC: 6.6 G/DL
RBC # BLD: 4.3 M/UL
RBC # FLD: 12.8 %
SODIUM SERPL-SCNC: 144 MMOL/L
TRIGL SERPL-MCNC: 132 MG/DL
VIT B12 SERPL-MCNC: 308 PG/ML
WBC # FLD AUTO: 4.93 K/UL

## 2023-09-08 PROCEDURE — 99215 OFFICE O/P EST HI 40 MIN: CPT | Mod: 25

## 2023-09-08 PROCEDURE — G0447 BEHAVIOR COUNSEL OBESITY 15M: CPT | Mod: 59

## 2023-09-08 NOTE — HISTORY OF PRESENT ILLNESS
[FreeTextEntry1] : Ms. TRE YOUNG is a 53 year old female Coming for follow-up obesity status post gastric sleeve not able to lose weight also hormone checked as has family history of thyroid disease 2018 gastric sleeve lost weight 100 started to gain back 30 started Wegovy 12/2022 now at 2.4mg q week  tolerating well lost 40lb on it , 20lb since on present dose from 4/2023 with no side effects   sister with Hashimoto thyroiditis on thyroid supplements   used to see a Nutritionist through the [facility] that she liked , agreed to go back, did not go yet  does walking and upper body exercise 3 times a week, has lower back pain if she does go to the gym

## 2023-09-08 NOTE — PHYSICAL EXAM
[Alert] : alert [Well Nourished] : well nourished [No Acute Distress] : no acute distress [Well Developed] : well developed [Normal Sclera/Conjunctiva] : normal sclera/conjunctiva [EOMI] : extra ocular movement intact [No Proptosis] : no proptosis [Normal Oropharynx] : the oropharynx was normal [No Respiratory Distress] : no respiratory distress [No Accessory Muscle Use] : no accessory muscle use [Clear to Auscultation] : lungs were clear to auscultation bilaterally [Normal S1, S2] : normal S1 and S2 [Normal Rate] : heart rate was normal [Regular Rhythm] : with a regular rhythm [No Edema] : no peripheral edema [Pedal Pulses Normal] : the pedal pulses are present [Normal Bowel Sounds] : normal bowel sounds [Not Tender] : non-tender [Not Distended] : not distended [Soft] : abdomen soft [Normal Anterior Cervical Nodes] : no anterior cervical lymphadenopathy [No Spinal Tenderness] : no spinal tenderness [Spine Straight] : spine straight [No Stigmata of Cushings Syndrome] : no stigmata of Cushings Syndrome [Normal Gait] : normal gait [Normal Strength/Tone] : muscle strength and tone were normal [No Rash] : no rash [Normal Reflexes] : deep tendon reflexes were 2+ and symmetric [No Tremors] : no tremors [Oriented x3] : oriented to person, place, and time [Obese] : obese [No Lid Lag] : no lid lag [Normal Outer Ear/Nose] : the ears and nose were normal in appearance [Normal Hearing] : hearing was normal [Kyphosis] : no kyphosis present [Scoliosis] : no scoliosis [Acanthosis Nigricans] : no acanthosis nigricans [Acne] : no acne [Hirsutism] : no hirsutism [Normal Affect] : the affect was normal [Normal Mood] : the mood was normal [de-identified] : Mildly enlarged thyroid on exam [de-identified] : Has some mild rash on her upper arm and her lower extremity on the left calf sees dermatology patient believes he gets worse when she eats carbohydrates

## 2023-09-08 NOTE — ASSESSMENT
[Little interest or pleasure doing things] : 1) Little interest or pleasure doing things [Feeling down, depressed, or hopeless] : 2) Feeling down, depressed, or hopeless [0] : 2) Feeling down, depressed, or hopeless: Not at all (0)

## 2023-10-11 ENCOUNTER — APPOINTMENT (OUTPATIENT)
Dept: DERMATOLOGY | Facility: CLINIC | Age: 53
End: 2023-10-11
Payer: COMMERCIAL

## 2023-10-11 VITALS — BODY MASS INDEX: 30.4 KG/M2 | WEIGHT: 161 LBS | HEIGHT: 61 IN

## 2023-10-11 DIAGNOSIS — L82.0 INFLAMED SEBORRHEIC KERATOSIS: ICD-10-CM

## 2023-10-11 DIAGNOSIS — L81.1 CHLOASMA: ICD-10-CM

## 2023-10-11 PROCEDURE — 17110 DESTRUCTION B9 LES UP TO 14: CPT

## 2023-10-11 PROCEDURE — 99203 OFFICE O/P NEW LOW 30 MIN: CPT | Mod: 25

## 2023-10-11 RX ORDER — HYDROQUINONE 40 MG/G
4 CREAM TOPICAL
Qty: 1 | Refills: 1 | Status: ACTIVE | COMMUNITY
Start: 2023-10-11 | End: 1900-01-01

## 2023-10-18 ENCOUNTER — NON-APPOINTMENT (OUTPATIENT)
Age: 53
End: 2023-10-18

## 2023-11-02 ENCOUNTER — APPOINTMENT (OUTPATIENT)
Dept: DERMATOLOGY | Facility: CLINIC | Age: 53
End: 2023-11-02

## 2023-11-06 ENCOUNTER — APPOINTMENT (OUTPATIENT)
Dept: INTERNAL MEDICINE | Facility: CLINIC | Age: 53
End: 2023-11-06
Payer: COMMERCIAL

## 2023-11-06 VITALS
BODY MASS INDEX: 30.4 KG/M2 | OXYGEN SATURATION: 98 % | HEART RATE: 98 BPM | HEIGHT: 61 IN | WEIGHT: 161 LBS | DIASTOLIC BLOOD PRESSURE: 80 MMHG | SYSTOLIC BLOOD PRESSURE: 120 MMHG

## 2023-11-06 DIAGNOSIS — R22.42 LOCALIZED SWELLING, MASS AND LUMP, LEFT LOWER LIMB: ICD-10-CM

## 2023-11-06 DIAGNOSIS — Z00.00 ENCOUNTER FOR GENERAL ADULT MEDICAL EXAMINATION W/OUT ABNORMAL FINDINGS: ICD-10-CM

## 2023-11-06 PROCEDURE — 99396 PREV VISIT EST AGE 40-64: CPT

## 2023-11-15 ENCOUNTER — APPOINTMENT (OUTPATIENT)
Dept: RHEUMATOLOGY | Facility: CLINIC | Age: 53
End: 2023-11-15
Payer: COMMERCIAL

## 2023-11-15 PROCEDURE — 36415 COLL VENOUS BLD VENIPUNCTURE: CPT

## 2023-11-16 LAB
ALBUMIN SERPL ELPH-MCNC: 4 G/DL
ALP BLD-CCNC: 58 U/L
ALT SERPL-CCNC: 9 U/L
ANION GAP SERPL CALC-SCNC: 11 MMOL/L
AST SERPL-CCNC: 9 U/L
BASOPHILS # BLD AUTO: 0.06 K/UL
BASOPHILS NFR BLD AUTO: 1 %
BILIRUB SERPL-MCNC: 0.3 MG/DL
BUN SERPL-MCNC: 14 MG/DL
CALCIUM SERPL-MCNC: 9.3 MG/DL
CHLORIDE SERPL-SCNC: 110 MMOL/L
CO2 SERPL-SCNC: 27 MMOL/L
CREAT SERPL-MCNC: 0.83 MG/DL
EGFR: 84 ML/MIN/1.73M2
EOSINOPHIL # BLD AUTO: 0.28 K/UL
EOSINOPHIL NFR BLD AUTO: 4.6 %
GLUCOSE SERPL-MCNC: 112 MG/DL
HCT VFR BLD CALC: 37.1 %
HGB BLD-MCNC: 11.8 G/DL
IMM GRANULOCYTES NFR BLD AUTO: 0.2 %
LYMPHOCYTES # BLD AUTO: 2.07 K/UL
LYMPHOCYTES NFR BLD AUTO: 34.2 %
MAN DIFF?: NORMAL
MCHC RBC-ENTMCNC: 29.6 PG
MCHC RBC-ENTMCNC: 31.8 GM/DL
MCV RBC AUTO: 93 FL
MONOCYTES # BLD AUTO: 0.37 K/UL
MONOCYTES NFR BLD AUTO: 6.1 %
NEUTROPHILS # BLD AUTO: 3.26 K/UL
NEUTROPHILS NFR BLD AUTO: 53.9 %
PLATELET # BLD AUTO: 231 K/UL
POTASSIUM SERPL-SCNC: 3.6 MMOL/L
PROT SERPL-MCNC: 6.5 G/DL
RBC # BLD: 3.99 M/UL
RBC # FLD: 12.7 %
SODIUM SERPL-SCNC: 147 MMOL/L
WBC # FLD AUTO: 6.05 K/UL

## 2023-11-17 ENCOUNTER — RESULT REVIEW (OUTPATIENT)
Age: 53
End: 2023-11-17

## 2023-11-29 ENCOUNTER — TRANSCRIPTION ENCOUNTER (OUTPATIENT)
Age: 53
End: 2023-11-29

## 2023-12-21 ENCOUNTER — APPOINTMENT (OUTPATIENT)
Dept: SURGERY | Facility: CLINIC | Age: 53
End: 2023-12-21
Payer: COMMERCIAL

## 2023-12-21 VITALS
HEART RATE: 73 BPM | DIASTOLIC BLOOD PRESSURE: 73 MMHG | OXYGEN SATURATION: 98 % | SYSTOLIC BLOOD PRESSURE: 121 MMHG | TEMPERATURE: 97.8 F

## 2023-12-21 VITALS — BODY MASS INDEX: 30.21 KG/M2 | HEIGHT: 61 IN | WEIGHT: 160 LBS

## 2023-12-21 PROCEDURE — 99202 OFFICE O/P NEW SF 15 MIN: CPT

## 2023-12-22 NOTE — REASON FOR VISIT
[Consultation] : a consultation visit [FreeTextEntry1] : Patient referred by Dr. Vasquez for the evaluation of a subcutaneous nodule in her left hip area

## 2023-12-22 NOTE — HISTORY OF PRESENT ILLNESS
[de-identified] : Patient is a 53-year-old woman who in her usual state of health who began noticing a lump in her left upper leg hip area that is mobile but tender when she presses on it  She has never had any lumps like this before never had any masses like this become inflamed or removed

## 2023-12-22 NOTE — ASSESSMENT
[FreeTextEntry1] : Patient has what appears to be a small lipomatous lesion.  It is subcuticular and could be excised in minor operating room under local anesthesia we will plan on doing this at a mutually convenient time patient is aware that she the small scar there but likely this benign pathology will be able to send it for evaluation she agrees with the procedure and will get it planned accordingly

## 2023-12-22 NOTE — PHYSICAL EXAM
[de-identified] : Looks well moves well [de-identified] : At junction of upper leg and hip on the left side has a 2 cm x 1 cm disc-shaped mobile lipomatous mass in the subcutaneous tissue consistent with the findings on ultrasound

## 2024-01-05 ENCOUNTER — APPOINTMENT (OUTPATIENT)
Dept: ENDOCRINOLOGY | Facility: CLINIC | Age: 54
End: 2024-01-05
Payer: COMMERCIAL

## 2024-01-05 VITALS
HEART RATE: 82 BPM | OXYGEN SATURATION: 98 % | BODY MASS INDEX: 28.51 KG/M2 | WEIGHT: 151 LBS | SYSTOLIC BLOOD PRESSURE: 118 MMHG | DIASTOLIC BLOOD PRESSURE: 72 MMHG | HEIGHT: 61 IN

## 2024-01-05 DIAGNOSIS — L65.9 NONSCARRING HAIR LOSS, UNSPECIFIED: ICD-10-CM

## 2024-01-05 LAB
ALBUMIN SERPL ELPH-MCNC: 4.5 G/DL
ALP BLD-CCNC: 56 U/L
ALT SERPL-CCNC: 12 U/L
ANION GAP SERPL CALC-SCNC: 10 MMOL/L
AST SERPL-CCNC: 13 U/L
BILIRUB SERPL-MCNC: 0.4 MG/DL
BUN SERPL-MCNC: 9 MG/DL
CALCIUM SERPL-MCNC: 9.6 MG/DL
CHLORIDE SERPL-SCNC: 108 MMOL/L
CHOLEST SERPL-MCNC: 189 MG/DL
CO2 SERPL-SCNC: 26 MMOL/L
CREAT SERPL-MCNC: 0.71 MG/DL
EGFR: 101 ML/MIN/1.73M2
FOLATE SERPL-MCNC: >20 NG/ML
GLUCOSE SERPL-MCNC: 84 MG/DL
HDLC SERPL-MCNC: 58 MG/DL
LDLC SERPL CALC-MCNC: 117 MG/DL
NONHDLC SERPL-MCNC: 131 MG/DL
POTASSIUM SERPL-SCNC: 4.2 MMOL/L
PROT SERPL-MCNC: 6.9 G/DL
SODIUM SERPL-SCNC: 145 MMOL/L
T4 FREE SERPL-MCNC: 1.2 NG/DL
THYROGLOB AB SERPL-ACNC: <20 IU/ML
THYROPEROXIDASE AB SERPL IA-ACNC: 24.1 IU/ML
TRIGL SERPL-MCNC: 78 MG/DL
TSH SERPL-ACNC: 1.64 UIU/ML
VIT B12 SERPL-MCNC: >2000 PG/ML

## 2024-01-05 PROCEDURE — G2211 COMPLEX E/M VISIT ADD ON: CPT

## 2024-01-05 PROCEDURE — G0447 BEHAVIOR COUNSEL OBESITY 15M: CPT | Mod: 59

## 2024-01-05 PROCEDURE — 99215 OFFICE O/P EST HI 40 MIN: CPT

## 2024-01-05 RX ORDER — MONTELUKAST 10 MG/1
10 TABLET, FILM COATED ORAL
Qty: 30 | Refills: 1 | Status: DISCONTINUED | COMMUNITY
Start: 2021-05-28 | End: 2024-01-05

## 2024-01-05 RX ORDER — MECOBALAMIN 5000 MCG
5000 LOZENGE ORAL
Qty: 90 | Refills: 1 | Status: ACTIVE | COMMUNITY
Start: 2023-09-08

## 2024-01-05 NOTE — PHYSICAL EXAM
[Alert] : alert [Well Nourished] : well nourished [Obese] : obese [No Acute Distress] : no acute distress [Well Developed] : well developed [Normal Sclera/Conjunctiva] : normal sclera/conjunctiva [EOMI] : extra ocular movement intact [No Proptosis] : no proptosis [No Lid Lag] : no lid lag [Normal Outer Ear/Nose] : the ears and nose were normal in appearance [Normal Hearing] : hearing was normal [Normal Oropharynx] : the oropharynx was normal [No Respiratory Distress] : no respiratory distress [No Accessory Muscle Use] : no accessory muscle use [Clear to Auscultation] : lungs were clear to auscultation bilaterally [Normal S1, S2] : normal S1 and S2 [Normal Rate] : heart rate was normal [Regular Rhythm] : with a regular rhythm [No Edema] : no peripheral edema [Pedal Pulses Normal] : the pedal pulses are present [Normal Bowel Sounds] : normal bowel sounds [Not Tender] : non-tender [Not Distended] : not distended [Soft] : abdomen soft [Normal Anterior Cervical Nodes] : no anterior cervical lymphadenopathy [No Spinal Tenderness] : no spinal tenderness [Spine Straight] : spine straight [No Stigmata of Cushings Syndrome] : no stigmata of Cushings Syndrome [Normal Gait] : normal gait [Normal Strength/Tone] : muscle strength and tone were normal [No Rash] : no rash [Normal Reflexes] : deep tendon reflexes were 2+ and symmetric [No Tremors] : no tremors [Oriented x3] : oriented to person, place, and time [Normal Affect] : the affect was normal [Normal Mood] : the mood was normal [Kyphosis] : no kyphosis present [Scoliosis] : no scoliosis [Acanthosis Nigricans] : no acanthosis nigricans [Acne] : no acne [Hirsutism] : no hirsutism [de-identified] : Mildly enlarged thyroid on exam [de-identified] : Has some mild rash on her upper arm and her lower extremity on the left calf sees dermatology patient believes he gets worse when she eats carbohydrates

## 2024-01-06 ENCOUNTER — RX RENEWAL (OUTPATIENT)
Age: 54
End: 2024-01-06

## 2024-01-11 ENCOUNTER — RESULT REVIEW (OUTPATIENT)
Age: 54
End: 2024-01-11

## 2024-01-12 ENCOUNTER — APPOINTMENT (OUTPATIENT)
Dept: SURGERY | Facility: HOSPITAL | Age: 54
End: 2024-01-12

## 2024-01-12 ENCOUNTER — TRANSCRIPTION ENCOUNTER (OUTPATIENT)
Age: 54
End: 2024-01-12

## 2024-01-18 ENCOUNTER — APPOINTMENT (OUTPATIENT)
Dept: SURGERY | Facility: CLINIC | Age: 54
End: 2024-01-18
Payer: COMMERCIAL

## 2024-01-18 VITALS
DIASTOLIC BLOOD PRESSURE: 70 MMHG | OXYGEN SATURATION: 97 % | HEART RATE: 77 BPM | SYSTOLIC BLOOD PRESSURE: 108 MMHG | TEMPERATURE: 98 F

## 2024-01-18 DIAGNOSIS — D17.20 BENIGN LIPOMATOUS NEOPLASM OF SKIN AND SUBCUTANEOUS TISSUE OF UNSPECIFIED LIMB: ICD-10-CM

## 2024-01-18 PROCEDURE — 99024 POSTOP FOLLOW-UP VISIT: CPT

## 2024-01-18 NOTE — REASON FOR VISIT
[Post Op: _________] : a [unfilled] post op visit [FreeTextEntry1] : Status post excision soft tissue tumor left hip area

## 2024-01-18 NOTE — HISTORY OF PRESENT ILLNESS
[de-identified] : Doing well since the operation no bleeding no bruising  Pathology came back consistent with lipoma

## 2024-01-18 NOTE — ASSESSMENT
[FreeTextEntry1] : Doing well after soft tissue tumor removal left hip  Sutures removed activity described we will see her back as needed

## 2024-02-23 ENCOUNTER — APPOINTMENT (OUTPATIENT)
Dept: RHEUMATOLOGY | Facility: CLINIC | Age: 54
End: 2024-02-23
Payer: COMMERCIAL

## 2024-02-23 VITALS
SYSTOLIC BLOOD PRESSURE: 104 MMHG | BODY MASS INDEX: 26.81 KG/M2 | WEIGHT: 142 LBS | DIASTOLIC BLOOD PRESSURE: 62 MMHG | HEIGHT: 61 IN | HEART RATE: 85 BPM | OXYGEN SATURATION: 98 %

## 2024-02-23 DIAGNOSIS — M72.2 PLANTAR FASCIAL FIBROMATOSIS: ICD-10-CM

## 2024-02-23 DIAGNOSIS — M46.1 SACROILIITIS, NOT ELSEWHERE CLASSIFIED: ICD-10-CM

## 2024-02-23 DIAGNOSIS — M47.819 SPONDYLOSIS W/OUT MYELOPATHY OR RADICULOPATHY, SITE UNSPECIFIED: ICD-10-CM

## 2024-02-23 PROCEDURE — 20610 DRAIN/INJ JOINT/BURSA W/O US: CPT | Mod: LT

## 2024-02-23 PROCEDURE — 99214 OFFICE O/P EST MOD 30 MIN: CPT | Mod: 25

## 2024-02-23 RX ORDER — PRENATAL VIT 49/IRON FUM/FOLIC 6.75-0.2MG
TABLET ORAL
Refills: 0 | Status: ACTIVE | COMMUNITY

## 2024-02-23 RX ORDER — TRIAMCINOLONE ACETONIDE 40 MG/ML
40 SUSPENSION INTRA-ARTERIAL; INTRAMUSCULAR
Qty: 1 | Refills: 0 | Status: COMPLETED | OUTPATIENT
Start: 2024-02-23 | End: 2024-02-24

## 2024-02-23 RX ORDER — LEFLUNOMIDE 20 MG/1
20 TABLET, FILM COATED ORAL
Qty: 90 | Refills: 1 | Status: ACTIVE | COMMUNITY
Start: 2023-02-03 | End: 1900-01-01

## 2024-02-23 RX ORDER — TRIAMCINOLONE ACETONIDE 40 MG/ML
40 SUSPENSION INTRA-ARTERIAL; INTRAMUSCULAR
Qty: 1 | Refills: 0 | Status: COMPLETED
Start: 2024-02-23 | End: 2024-02-24

## 2024-02-23 RX ADMIN — TRIAMCINOLONE ACETONIDE 0 MG/ML: 40 SUSPENSION INTRA-ARTERIAL; INTRAMUSCULAR at 00:00

## 2024-02-23 NOTE — PHYSICAL EXAM
[General Appearance - Alert] : alert [General Appearance - In No Acute Distress] : in no acute distress [General Appearance - Well Nourished] : well nourished [General Appearance - Well Developed] : well developed [Sclera] : the sclera and conjunctiva were normal [] : no rash [Motor Exam] : the motor exam was normal

## 2024-02-26 LAB
ALBUMIN SERPL ELPH-MCNC: 4.5 G/DL
ALP BLD-CCNC: 46 U/L
ALT SERPL-CCNC: 11 U/L
ANION GAP SERPL CALC-SCNC: 13 MMOL/L
AST SERPL-CCNC: 12 U/L
BILIRUB SERPL-MCNC: 0.5 MG/DL
BUN SERPL-MCNC: 11 MG/DL
CALCIUM SERPL-MCNC: 9.5 MG/DL
CHLORIDE SERPL-SCNC: 106 MMOL/L
CO2 SERPL-SCNC: 26 MMOL/L
CREAT SERPL-MCNC: 0.77 MG/DL
EGFR: 92 ML/MIN/1.73M2
GLUCOSE SERPL-MCNC: 80 MG/DL
HCT VFR BLD CALC: 42 %
HGB BLD-MCNC: 13.8 G/DL
MCHC RBC-ENTMCNC: 30.3 PG
MCHC RBC-ENTMCNC: 32.9 GM/DL
MCV RBC AUTO: 92.1 FL
PLATELET # BLD AUTO: 266 K/UL
POTASSIUM SERPL-SCNC: 4 MMOL/L
PROT SERPL-MCNC: 7.2 G/DL
RBC # BLD: 4.56 M/UL
RBC # FLD: 13.2 %
SODIUM SERPL-SCNC: 145 MMOL/L
WBC # FLD AUTO: 4.58 K/UL

## 2024-03-20 RX ORDER — SEMAGLUTIDE 2.4 MG/.75ML
2.4 INJECTION, SOLUTION SUBCUTANEOUS
Qty: 3 | Refills: 1 | Status: ACTIVE | COMMUNITY
Start: 2022-12-13 | End: 1900-01-01

## 2024-04-19 ENCOUNTER — APPOINTMENT (OUTPATIENT)
Dept: ENDOCRINOLOGY | Facility: CLINIC | Age: 54
End: 2024-04-19
Payer: COMMERCIAL

## 2024-04-19 VITALS
HEIGHT: 61 IN | SYSTOLIC BLOOD PRESSURE: 102 MMHG | HEART RATE: 96 BPM | OXYGEN SATURATION: 99 % | BODY MASS INDEX: 25.49 KG/M2 | WEIGHT: 135 LBS | DIASTOLIC BLOOD PRESSURE: 62 MMHG

## 2024-04-19 DIAGNOSIS — L65.9 NONSCARRING HAIR LOSS, UNSPECIFIED: ICD-10-CM

## 2024-04-19 DIAGNOSIS — E66.01 MORBID (SEVERE) OBESITY DUE TO EXCESS CALORIES: ICD-10-CM

## 2024-04-19 DIAGNOSIS — Z13.820 ENCOUNTER FOR SCREENING FOR OSTEOPOROSIS: ICD-10-CM

## 2024-04-19 DIAGNOSIS — I10 ESSENTIAL (PRIMARY) HYPERTENSION: ICD-10-CM

## 2024-04-19 DIAGNOSIS — E78.00 PURE HYPERCHOLESTEROLEMIA, UNSPECIFIED: ICD-10-CM

## 2024-04-19 DIAGNOSIS — E04.1 NONTOXIC SINGLE THYROID NODULE: ICD-10-CM

## 2024-04-19 DIAGNOSIS — E53.8 DEFICIENCY OF OTHER SPECIFIED B GROUP VITAMINS: ICD-10-CM

## 2024-04-19 LAB
25(OH)D3 SERPL-MCNC: 45.8 NG/ML
ALBUMIN SERPL ELPH-MCNC: 4.1 G/DL
ALP BLD-CCNC: 48 U/L
ALT SERPL-CCNC: 11 U/L
ANION GAP SERPL CALC-SCNC: 11 MMOL/L
AST SERPL-CCNC: 12 U/L
BILIRUB SERPL-MCNC: 0.6 MG/DL
BUN SERPL-MCNC: 11 MG/DL
CALCIUM SERPL-MCNC: 9.5 MG/DL
CALCIUM SERPL-MCNC: 9.5 MG/DL
CHLORIDE SERPL-SCNC: 108 MMOL/L
CHOLEST SERPL-MCNC: 181 MG/DL
CO2 SERPL-SCNC: 27 MMOL/L
COPPER SERPL-MCNC: 87 UG/DL
CREAT SERPL-MCNC: 0.83 MG/DL
EGFR: 84 ML/MIN/1.73M2
ESTIMATED AVERAGE GLUCOSE: 88 MG/DL
FERRITIN SERPL-MCNC: 175 NG/ML
FOLATE SERPL-MCNC: 15.9 NG/ML
GLUCOSE SERPL-MCNC: 84 MG/DL
HBA1C MFR BLD HPLC: 4.7 %
HCT VFR BLD CALC: 40.8 %
HDLC SERPL-MCNC: 64 MG/DL
HGB BLD-MCNC: 13.8 G/DL
IRON SATN MFR SERPL: 41 %
IRON SERPL-MCNC: 118 UG/DL
LDLC SERPL CALC-MCNC: 104 MG/DL
MCHC RBC-ENTMCNC: 30.7 PG
MCHC RBC-ENTMCNC: 33.8 GM/DL
MCV RBC AUTO: 90.7 FL
NONHDLC SERPL-MCNC: 117 MG/DL
PARATHYROID HORMONE INTACT: 40 PG/ML
PLATELET # BLD AUTO: 281 K/UL
POTASSIUM SERPL-SCNC: 4.4 MMOL/L
PROT SERPL-MCNC: 6.5 G/DL
RBC # BLD: 4.5 M/UL
RBC # FLD: 13 %
SODIUM SERPL-SCNC: 146 MMOL/L
TIBC SERPL-MCNC: 289 UG/DL
TRIGL SERPL-MCNC: 68 MG/DL
UIBC SERPL-MCNC: 171 UG/DL
VIT A SERPL-MCNC: 44.2 UG/DL
VIT B12 SERPL-MCNC: 775 PG/ML
WBC # FLD AUTO: 4.93 K/UL
ZINC SERPL-MCNC: 75 UG/DL

## 2024-04-19 PROCEDURE — 99215 OFFICE O/P EST HI 40 MIN: CPT

## 2024-04-19 RX ORDER — FOLIC ACID 1 MG/1
1 TABLET ORAL DAILY
Qty: 90 | Refills: 3 | Status: DISCONTINUED | COMMUNITY
Start: 2023-09-08 | End: 2024-04-19

## 2024-04-23 ENCOUNTER — LABORATORY RESULT (OUTPATIENT)
Age: 54
End: 2024-04-23

## 2024-04-23 ENCOUNTER — APPOINTMENT (OUTPATIENT)
Dept: OBGYN | Facility: CLINIC | Age: 54
End: 2024-04-23
Payer: COMMERCIAL

## 2024-04-23 VITALS
BODY MASS INDEX: 25.3 KG/M2 | DIASTOLIC BLOOD PRESSURE: 70 MMHG | SYSTOLIC BLOOD PRESSURE: 110 MMHG | WEIGHT: 134 LBS | HEIGHT: 61 IN

## 2024-04-23 DIAGNOSIS — Z01.419 ENCOUNTER FOR GYNECOLOGICAL EXAMINATION (GENERAL) (ROUTINE) W/OUT ABNORMAL FINDINGS: ICD-10-CM

## 2024-04-23 PROCEDURE — 99396 PREV VISIT EST AGE 40-64: CPT

## 2024-04-23 NOTE — HISTORY OF PRESENT ILLNESS
[TextBox_4] : 55yo P2 here for annual. No periods since 2019. H/o ablation for abnormal bleeding in 2/2016. She had a sleeve gastrectomy 2018 with Justin and has lost 80 lbs.  Has had some back pain lately and less able to work out. C/o decreased libido.  ; 2 children- in their 20s.  Both have left home. Pt works at a pediatric office; [employer] in [facility]/ now part of [facility]..

## 2024-04-26 LAB
CYTOLOGY CVX/VAG DOC THIN PREP: ABNORMAL
HPV HIGH+LOW RISK DNA PNL CVX: DETECTED

## 2024-04-27 NOTE — PHYSICAL EXAM
[Kyphosis] : no kyphosis present [Scoliosis] : no scoliosis [Acanthosis Nigricans] : no acanthosis nigricans [Acne] : no acne [Hirsutism] : no hirsutism [de-identified] : Mildly enlarged thyroid on exam [de-identified] : Has some mild rash on her upper arm and her lower extremity on the left calf sees dermatology patient believes he gets worse when she eats carbohydrates

## 2024-04-27 NOTE — REVIEW OF SYSTEMS
[FreeTextEntry6] : Has asthma follows with Dr. Manjarrez has shortness of breath with exertion and only

## 2024-04-27 NOTE — END OF VISIT
[FreeTextEntry3] :  I, Nawaf Simmons, am scribing for and in the presence of Dr. Janeth Choi in the following sections: HISTORY OF PRESENT ILLNESS; REVIEW OF SYSTEMS; PHYSICAL EXAM; ASSESSMENT/ PLAN. I, Janeth Choi, personally performed the services described in the documentation, reviewed the documentation recorded by the scribe in my presence, and it accurately and completely records my words and actions. 4/19/2024.

## 2024-04-27 NOTE — HISTORY OF PRESENT ILLNESS
[FreeTextEntry1] : Ms. TRE YOUNG is a 54 year old female Coming for follow-up obesity status post gastric sleeve not able to lose weight also hormone checked as has family history of thyroid disease 2018 gastric sleeve lost weight 100 started to gain back 30 started Wegovy 12/2022 now at 2.4mg q week  tolerating well lost 40lb on it , 20lb since on present dose from 4/2023 with no side effects   sister with Hashimoto thyroiditis on thyroid supplements   used to see a Nutritionist through the [facility] that she liked , agreed to go back, did not go yet  does walking and upper body exercise 3 times a week, has lower back pain if she does go to the gym   4/19/24 follow up-  Pt states she is doing well on Wegovy 2.4 mg weekly. She is feeling good with no side effects. She has also lost 7 more lbs, bringing her total weight loss to 53 lbs over the past year. Exercises 3x per week.   Labs reviewed with pt at length. Sodium was a little high. She admits she does not drink enough water during the day. The rest of her lab work looked very good.

## 2024-05-28 ENCOUNTER — RESULT REVIEW (OUTPATIENT)
Age: 54
End: 2024-05-28

## 2024-06-21 ENCOUNTER — TRANSCRIPTION ENCOUNTER (OUTPATIENT)
Age: 54
End: 2024-06-21

## 2024-07-02 ENCOUNTER — TRANSCRIPTION ENCOUNTER (OUTPATIENT)
Age: 54
End: 2024-07-02

## 2024-08-05 ENCOUNTER — TRANSCRIPTION ENCOUNTER (OUTPATIENT)
Age: 54
End: 2024-08-05

## 2024-08-13 ENCOUNTER — APPOINTMENT (OUTPATIENT)
Dept: ORTHOPEDIC SURGERY | Facility: CLINIC | Age: 54
End: 2024-08-13
Payer: COMMERCIAL

## 2024-08-13 DIAGNOSIS — M54.16 RADICULOPATHY, LUMBAR REGION: ICD-10-CM

## 2024-08-13 PROCEDURE — 72110 X-RAY EXAM L-2 SPINE 4/>VWS: CPT

## 2024-08-13 PROCEDURE — 99204 OFFICE O/P NEW MOD 45 MIN: CPT

## 2024-08-14 NOTE — PLAN
[TextEntry] : I recommended an MRI of the lumbosacral spine. She'll follow up with the aforementioned films prior to consideration of further treatment. I would be reluctant in this case to consider an operation based on her current description of her symptoms. Nonetheless, we will discuss further following MRI lumbal sacral spine. She is neurologically intact in that she can stand and walk on her toes and heels and easily step up and down into a step. She has no long-track signs or hyperflexia.

## 2024-08-14 NOTE — PHYSICAL EXAM
[Normal] : Alert and in no acute distress [de-identified] : Lumbar Spine:   Gait: normal   ROM: full without pain x 4    Palpation: +ttp L4-5 midline extending distally, Rt PSIS    Motor Strength:     Right: 5/5 throughout     Left: 5/5 throughout   Sensation:     Right: decreased SLT proximal anterior thigh     Left: SILT throughout [de-identified] : I reviewed her x-rays. She has a well-aligned, stable appearing spine.

## 2024-08-14 NOTE — HISTORY OF PRESENT ILLNESS
[de-identified] : Vandana Malin is here for consultation regarding her chronic lower back pain, tailbone pain, and alternating bilateral lower extremity pain and sensitivity. She has a sensitivity to touch in the lower back, the tailbone, and right now on her right anterior thigh. She feels almost a pinching sensation and a sensitivity to touch. It also feels like it's burning at times. It's constant and works with prolonged standing. She had an injection two years ago. It looks like they tried to perform medial branch blocks. This may be an option for her in the future if we document facet arthrosis consistent with her symptoms, though her radiating leg pain would not come from this.

## 2024-08-14 NOTE — END OF VISIT
[FreeTextEntry3] :   Documented by Kristel Canela acting as a scribe for Dr. Gage Coates. 08/13/2024   All medical record entries made by the Kristel Canela (Scribe) were at my, Dr. Gage Coates. direction and personally dictated by me on 08/13/2024. I have reviewed the chart and agree that the record accurately reflects my personal performance of the history, physical exam, assessment and plan. I have also personally directed, reviewed, and agreed with the chart.

## 2024-08-14 NOTE — HISTORY OF PRESENT ILLNESS
[de-identified] : Vandana Malin is here for consultation regarding her chronic lower back pain, tailbone pain, and alternating bilateral lower extremity pain and sensitivity. She has a sensitivity to touch in the lower back, the tailbone, and right now on her right anterior thigh. She feels almost a pinching sensation and a sensitivity to touch. It also feels like it's burning at times. It's constant and works with prolonged standing. She had an injection two years ago. It looks like they tried to perform medial branch blocks. This may be an option for her in the future if we document facet arthrosis consistent with her symptoms, though her radiating leg pain would not come from this.

## 2024-08-14 NOTE — PHYSICAL EXAM
[Normal] : Alert and in no acute distress [de-identified] : Lumbar Spine:   Gait: normal   ROM: full without pain x 4    Palpation: +ttp L4-5 midline extending distally, Rt PSIS    Motor Strength:     Right: 5/5 throughout     Left: 5/5 throughout   Sensation:     Right: decreased SLT proximal anterior thigh     Left: SILT throughout [de-identified] : I reviewed her x-rays. She has a well-aligned, stable appearing spine.

## 2024-08-14 NOTE — ADDENDUM
[FreeTextEntry1] :   Documented by Kristel Canela acting as a scribe for Dr. Gage Coates. 08/13/2024

## 2024-08-27 ENCOUNTER — APPOINTMENT (OUTPATIENT)
Dept: ORTHOPEDIC SURGERY | Facility: CLINIC | Age: 54
End: 2024-08-27
Payer: COMMERCIAL

## 2024-08-27 VITALS
BODY MASS INDEX: 24.92 KG/M2 | WEIGHT: 132 LBS | HEIGHT: 61 IN | HEART RATE: 74 BPM | SYSTOLIC BLOOD PRESSURE: 144 MMHG | OXYGEN SATURATION: 99 % | DIASTOLIC BLOOD PRESSURE: 89 MMHG

## 2024-08-27 DIAGNOSIS — M48.061 SPINAL STENOSIS, LUMBAR REGION WITHOUT NEUROGENIC CLAUDICATION: ICD-10-CM

## 2024-08-27 PROCEDURE — 99214 OFFICE O/P EST MOD 30 MIN: CPT

## 2024-08-28 NOTE — PLAN
[TextEntry] : There is some widening of the facets and some subarticular stenosis left greater than right. For this, I have recommended transforaminal injection of epidural steroid at L4-5 with my colleague Dr. Earl Kramer. If her symptoms are recalcitrant, she may consider facet block and ablation at this same level. We will proceed with the aforementioned injection. Prior to physiotherapy, and return to the gym. I explained that there is a 100% likelihood of need for future surgery, including fusion of the L4-5 inner space. At this stage, she's neurologically intact.

## 2024-08-28 NOTE — END OF VISIT
[FreeTextEntry3] : Documented by Mary Dias acting as a scribe for Gage Coates on 08/27/2024   All medical record entries made by the Scribe were at my, Dr. Gage Coates direction and personally dictated by me on 08/27/2024 . I have reviewed the chart and agree that the record accurately reflects my personal performance of the history, physical exam, assessment and plan. I have also personally directed, reviewed, and agreed with the chart.

## 2024-08-28 NOTE — ADDENDUM
[FreeTextEntry1] : I, Mary Dias (scribe) assisted in filling out this chart under the dictation of Gage Coates on 08/27/2024

## 2024-08-28 NOTE — HISTORY OF PRESENT ILLNESS
[de-identified] : Vandana is here with a new MRI of the lumbosacral spine. Consistent with their symptoms is disc height loss and neuroframinal stenosis, as well as concomitant facet OA at L4-5. She is neurologically intact. She is anxious to return to the gym. She is anxious to avoid surgical intervention.

## 2024-08-28 NOTE — HISTORY OF PRESENT ILLNESS
[de-identified] : Vandana is here with a new MRI of the lumbosacral spine. Consistent with their symptoms is disc height loss and neuroframinal stenosis, as well as concomitant facet OA at L4-5. She is neurologically intact. She is anxious to return to the gym. She is anxious to avoid surgical intervention.

## 2024-08-28 NOTE — HISTORY OF PRESENT ILLNESS
[de-identified] : Vandana is here with a new MRI of the lumbosacral spine. Consistent with their symptoms is disc height loss and neuroframinal stenosis, as well as concomitant facet OA at L4-5. She is neurologically intact. She is anxious to return to the gym. She is anxious to avoid surgical intervention.

## 2024-09-05 ENCOUNTER — APPOINTMENT (OUTPATIENT)
Dept: RHEUMATOLOGY | Facility: CLINIC | Age: 54
End: 2024-09-05
Payer: COMMERCIAL

## 2024-09-05 VITALS
BODY MASS INDEX: 25.11 KG/M2 | DIASTOLIC BLOOD PRESSURE: 80 MMHG | SYSTOLIC BLOOD PRESSURE: 120 MMHG | HEART RATE: 75 BPM | OXYGEN SATURATION: 98 % | HEIGHT: 61 IN | WEIGHT: 133 LBS

## 2024-09-05 DIAGNOSIS — M47.819 SPONDYLOSIS W/OUT MYELOPATHY OR RADICULOPATHY, SITE UNSPECIFIED: ICD-10-CM

## 2024-09-05 PROCEDURE — G2211 COMPLEX E/M VISIT ADD ON: CPT

## 2024-09-05 PROCEDURE — 20610 DRAIN/INJ JOINT/BURSA W/O US: CPT | Mod: LT

## 2024-09-05 PROCEDURE — 99214 OFFICE O/P EST MOD 30 MIN: CPT | Mod: 25

## 2024-09-05 RX ORDER — TRIAMCINOLONE ACETONIDE 40 MG/ML
40 SUSPENSION INTRA-ARTERIAL; INTRAMUSCULAR
Qty: 1 | Refills: 0 | Status: COMPLETED
Start: 2024-09-05 | End: 2024-09-12

## 2024-09-05 RX ORDER — TRIAMCINOLONE ACETONIDE 40 MG/ML
40 SUSPENSION INTRA-ARTERIAL; INTRAMUSCULAR
Qty: 1 | Refills: 0 | Status: COMPLETED | OUTPATIENT
Start: 2024-09-05 | End: 2024-09-12

## 2024-09-05 RX ADMIN — TRIAMCINOLONE ACETONIDE 0 MG/ML: 40 SUSPENSION INTRA-ARTERIAL; INTRAMUSCULAR at 00:00

## 2024-09-05 NOTE — HISTORY OF PRESENT ILLNESS
[FreeTextEntry1] : Patient reports left ankle injection done at last visit was very effective. About one week ago the left ankle started to act up again with swelling, pain and AM stiffness. Knees hurt after a day of walking. Taking medication as Rx'ed.

## 2024-09-05 NOTE — PHYSICAL EXAM
[General Appearance - Alert] : alert [General Appearance - In No Acute Distress] : in no acute distress [General Appearance - Well Nourished] : well nourished [General Appearance - Well Developed] : well developed [Sclera] : the sclera and conjunctiva were normal [] : no rash [Motor Exam] : the motor exam was normal [FreeTextEntry1] : There is bogginess and joint line tenderness of the left ankle. No other synovitis is noted.

## 2024-09-06 LAB
ALBUMIN SERPL ELPH-MCNC: 4.5 G/DL
ALP BLD-CCNC: 47 U/L
ALT SERPL-CCNC: 8 U/L
ANION GAP SERPL CALC-SCNC: 12 MMOL/L
AST SERPL-CCNC: 12 U/L
BILIRUB SERPL-MCNC: 0.4 MG/DL
BUN SERPL-MCNC: 14 MG/DL
CALCIUM SERPL-MCNC: 9.3 MG/DL
CHLORIDE SERPL-SCNC: 107 MMOL/L
CO2 SERPL-SCNC: 25 MMOL/L
CREAT SERPL-MCNC: 0.66 MG/DL
EGFR: 104 ML/MIN/1.73M2
GLUCOSE SERPL-MCNC: 76 MG/DL
HCT VFR BLD CALC: 40.7 %
HGB BLD-MCNC: 12.9 G/DL
MCHC RBC-ENTMCNC: 29.9 PG
MCHC RBC-ENTMCNC: 31.7 GM/DL
MCV RBC AUTO: 94.4 FL
PLATELET # BLD AUTO: 235 K/UL
POTASSIUM SERPL-SCNC: 4.4 MMOL/L
PROT SERPL-MCNC: 6.7 G/DL
RBC # BLD: 4.31 M/UL
RBC # FLD: 13.1 %
SODIUM SERPL-SCNC: 144 MMOL/L
WBC # FLD AUTO: 4.49 K/UL

## 2024-10-23 ENCOUNTER — RESULT REVIEW (OUTPATIENT)
Age: 54
End: 2024-10-23

## 2024-10-25 ENCOUNTER — APPOINTMENT (OUTPATIENT)
Dept: ENDOCRINOLOGY | Facility: CLINIC | Age: 54
End: 2024-10-25
Payer: COMMERCIAL

## 2024-10-25 ENCOUNTER — NON-APPOINTMENT (OUTPATIENT)
Age: 54
End: 2024-10-25

## 2024-10-25 VITALS
HEIGHT: 61 IN | DIASTOLIC BLOOD PRESSURE: 62 MMHG | OXYGEN SATURATION: 97 % | HEART RATE: 108 BPM | WEIGHT: 128 LBS | BODY MASS INDEX: 24.17 KG/M2 | SYSTOLIC BLOOD PRESSURE: 94 MMHG

## 2024-10-25 DIAGNOSIS — E78.5 HYPERLIPIDEMIA, UNSPECIFIED: ICD-10-CM

## 2024-10-25 DIAGNOSIS — I10 ESSENTIAL (PRIMARY) HYPERTENSION: ICD-10-CM

## 2024-10-25 DIAGNOSIS — E53.8 DEFICIENCY OF OTHER SPECIFIED B GROUP VITAMINS: ICD-10-CM

## 2024-10-25 DIAGNOSIS — E66.01 MORBID (SEVERE) OBESITY DUE TO EXCESS CALORIES: ICD-10-CM

## 2024-10-25 DIAGNOSIS — E61.0 COPPER DEFICIENCY: ICD-10-CM

## 2024-10-25 DIAGNOSIS — M85.89 OTHER SPECIFIED DISORDERS OF BONE DENSITY AND STRUCTURE, MULTIPLE SITES: ICD-10-CM

## 2024-10-25 DIAGNOSIS — L65.9 NONSCARRING HAIR LOSS, UNSPECIFIED: ICD-10-CM

## 2024-10-25 DIAGNOSIS — E04.1 NONTOXIC SINGLE THYROID NODULE: ICD-10-CM

## 2024-10-25 PROCEDURE — G2211 COMPLEX E/M VISIT ADD ON: CPT

## 2024-10-25 PROCEDURE — 99215 OFFICE O/P EST HI 40 MIN: CPT

## 2024-10-25 PROCEDURE — G0447 BEHAVIOR COUNSEL OBESITY 15M: CPT | Mod: 59

## 2024-10-30 PROBLEM — E61.0 COPPER DEFICIENCY: Status: ACTIVE | Noted: 2024-10-25

## 2024-10-30 PROBLEM — E78.5 DYSLIPIDEMIA: Status: ACTIVE | Noted: 2024-10-25

## 2024-10-30 PROBLEM — M85.89 OSTEOPENIA OF MULTIPLE SITES: Status: ACTIVE | Noted: 2024-10-25

## 2024-11-06 ENCOUNTER — RESULT REVIEW (OUTPATIENT)
Age: 54
End: 2024-11-06

## 2024-12-03 ENCOUNTER — APPOINTMENT (OUTPATIENT)
Dept: INTERNAL MEDICINE | Facility: CLINIC | Age: 54
End: 2024-12-03
Payer: COMMERCIAL

## 2024-12-03 VITALS
SYSTOLIC BLOOD PRESSURE: 120 MMHG | WEIGHT: 126 LBS | DIASTOLIC BLOOD PRESSURE: 60 MMHG | BODY MASS INDEX: 23.79 KG/M2 | OXYGEN SATURATION: 98 % | HEART RATE: 105 BPM | HEIGHT: 61 IN

## 2024-12-03 DIAGNOSIS — M53.3 SACROCOCCYGEAL DISORDERS, NOT ELSEWHERE CLASSIFIED: ICD-10-CM

## 2024-12-03 DIAGNOSIS — Z00.00 ENCOUNTER FOR GENERAL ADULT MEDICAL EXAMINATION W/OUT ABNORMAL FINDINGS: ICD-10-CM

## 2024-12-03 DIAGNOSIS — Z23 ENCOUNTER FOR IMMUNIZATION: ICD-10-CM

## 2024-12-03 PROCEDURE — 90471 IMMUNIZATION ADMIN: CPT

## 2024-12-03 PROCEDURE — 90715 TDAP VACCINE 7 YRS/> IM: CPT

## 2024-12-03 PROCEDURE — 99396 PREV VISIT EST AGE 40-64: CPT | Mod: 25

## 2024-12-03 PROCEDURE — 99213 OFFICE O/P EST LOW 20 MIN: CPT | Mod: 25

## 2024-12-03 RX ORDER — CELECOXIB 100 MG/1
100 CAPSULE ORAL TWICE DAILY
Qty: 180 | Refills: 0 | Status: ACTIVE | COMMUNITY
Start: 2024-12-03 | End: 1900-01-01

## 2024-12-04 ENCOUNTER — APPOINTMENT (OUTPATIENT)
Dept: RHEUMATOLOGY | Facility: CLINIC | Age: 54
End: 2024-12-04
Payer: COMMERCIAL

## 2024-12-04 PROCEDURE — 36415 COLL VENOUS BLD VENIPUNCTURE: CPT

## 2024-12-05 LAB
ALBUMIN SERPL ELPH-MCNC: 4 G/DL
ALP BLD-CCNC: 49 U/L
ALT SERPL-CCNC: 7 U/L
ANION GAP SERPL CALC-SCNC: 11 MMOL/L
AST SERPL-CCNC: 12 U/L
BASOPHILS # BLD AUTO: 0.06 K/UL
BASOPHILS NFR BLD AUTO: 0.9 %
BILIRUB SERPL-MCNC: 0.3 MG/DL
BUN SERPL-MCNC: 15 MG/DL
CALCIUM SERPL-MCNC: 9.4 MG/DL
CHLORIDE SERPL-SCNC: 104 MMOL/L
CO2 SERPL-SCNC: 28 MMOL/L
CREAT SERPL-MCNC: 0.85 MG/DL
EGFR: 81 ML/MIN/1.73M2
EOSINOPHIL # BLD AUTO: 0.08 K/UL
EOSINOPHIL NFR BLD AUTO: 1.2 %
GLUCOSE SERPL-MCNC: 83 MG/DL
HCT VFR BLD CALC: 38.1 %
HGB BLD-MCNC: 12.5 G/DL
IMM GRANULOCYTES NFR BLD AUTO: 0.3 %
LYMPHOCYTES # BLD AUTO: 2.1 K/UL
LYMPHOCYTES NFR BLD AUTO: 31.2 %
MAN DIFF?: NORMAL
MCHC RBC-ENTMCNC: 30.6 PG
MCHC RBC-ENTMCNC: 32.8 G/DL
MCV RBC AUTO: 93.2 FL
MONOCYTES # BLD AUTO: 0.43 K/UL
MONOCYTES NFR BLD AUTO: 6.4 %
NEUTROPHILS # BLD AUTO: 4.05 K/UL
NEUTROPHILS NFR BLD AUTO: 60 %
PLATELET # BLD AUTO: 249 K/UL
POTASSIUM SERPL-SCNC: 3.6 MMOL/L
PROT SERPL-MCNC: 6.4 G/DL
RBC # BLD: 4.09 M/UL
RBC # FLD: 12.4 %
SODIUM SERPL-SCNC: 142 MMOL/L
WBC # FLD AUTO: 6.74 K/UL

## 2024-12-12 ENCOUNTER — RESULT REVIEW (OUTPATIENT)
Age: 54
End: 2024-12-12

## 2024-12-20 DIAGNOSIS — M53.3 SACROCOCCYGEAL DISORDERS, NOT ELSEWHERE CLASSIFIED: ICD-10-CM

## 2024-12-30 DIAGNOSIS — M53.3 SACROCOCCYGEAL DISORDERS, NOT ELSEWHERE CLASSIFIED: ICD-10-CM

## 2025-01-10 ENCOUNTER — RESULT REVIEW (OUTPATIENT)
Age: 55
End: 2025-01-10

## 2025-01-13 ENCOUNTER — TRANSCRIPTION ENCOUNTER (OUTPATIENT)
Age: 55
End: 2025-01-13

## 2025-01-23 ENCOUNTER — LABORATORY RESULT (OUTPATIENT)
Age: 55
End: 2025-01-23

## 2025-01-30 ENCOUNTER — APPOINTMENT (OUTPATIENT)
Dept: ENDOCRINOLOGY | Facility: CLINIC | Age: 55
End: 2025-01-30
Payer: COMMERCIAL

## 2025-01-30 ENCOUNTER — NON-APPOINTMENT (OUTPATIENT)
Age: 55
End: 2025-01-30

## 2025-01-30 ENCOUNTER — RESULT REVIEW (OUTPATIENT)
Age: 55
End: 2025-01-30

## 2025-01-30 VITALS
HEIGHT: 61 IN | SYSTOLIC BLOOD PRESSURE: 132 MMHG | BODY MASS INDEX: 23.79 KG/M2 | OXYGEN SATURATION: 98 % | WEIGHT: 126 LBS | HEART RATE: 80 BPM | DIASTOLIC BLOOD PRESSURE: 80 MMHG

## 2025-01-30 DIAGNOSIS — E66.01 MORBID (SEVERE) OBESITY DUE TO EXCESS CALORIES: ICD-10-CM

## 2025-01-30 DIAGNOSIS — M85.89 OTHER SPECIFIED DISORDERS OF BONE DENSITY AND STRUCTURE, MULTIPLE SITES: ICD-10-CM

## 2025-01-30 DIAGNOSIS — Z82.69 FAMILY HISTORY OF OTHER DISEASES OF THE MUSCULOSKELETAL SYSTEM AND CONNECTIVE TISSUE: ICD-10-CM

## 2025-01-30 DIAGNOSIS — E78.5 HYPERLIPIDEMIA, UNSPECIFIED: ICD-10-CM

## 2025-01-30 DIAGNOSIS — I10 ESSENTIAL (PRIMARY) HYPERTENSION: ICD-10-CM

## 2025-01-30 DIAGNOSIS — E04.1 NONTOXIC SINGLE THYROID NODULE: ICD-10-CM

## 2025-01-30 DIAGNOSIS — E53.8 DEFICIENCY OF OTHER SPECIFIED B GROUP VITAMINS: ICD-10-CM

## 2025-01-30 DIAGNOSIS — E61.0 COPPER DEFICIENCY: ICD-10-CM

## 2025-01-30 DIAGNOSIS — Z78.9 OTHER SPECIFIED HEALTH STATUS: ICD-10-CM

## 2025-01-30 DIAGNOSIS — M47.819 SPONDYLOSIS W/OUT MYELOPATHY OR RADICULOPATHY, SITE UNSPECIFIED: ICD-10-CM

## 2025-01-30 DIAGNOSIS — Z13.820 ENCOUNTER FOR SCREENING FOR OSTEOPOROSIS: ICD-10-CM

## 2025-01-30 DIAGNOSIS — L65.9 NONSCARRING HAIR LOSS, UNSPECIFIED: ICD-10-CM

## 2025-01-30 PROCEDURE — 99215 OFFICE O/P EST HI 40 MIN: CPT

## 2025-01-30 PROCEDURE — G0447 BEHAVIOR COUNSEL OBESITY 15M: CPT | Mod: 59

## 2025-01-30 PROCEDURE — G2211 COMPLEX E/M VISIT ADD ON: CPT

## 2025-02-05 ENCOUNTER — TRANSCRIPTION ENCOUNTER (OUTPATIENT)
Age: 55
End: 2025-02-05